# Patient Record
Sex: FEMALE | Race: WHITE | NOT HISPANIC OR LATINO | Employment: OTHER | URBAN - METROPOLITAN AREA
[De-identification: names, ages, dates, MRNs, and addresses within clinical notes are randomized per-mention and may not be internally consistent; named-entity substitution may affect disease eponyms.]

---

## 2017-02-06 ENCOUNTER — OFFICE VISIT (OUTPATIENT)
Dept: OPHTHALMOLOGY | Facility: CLINIC | Age: 82
End: 2017-02-06
Payer: MEDICARE

## 2017-02-06 DIAGNOSIS — H40.1132 PRIMARY OPEN ANGLE GLAUCOMA OF BOTH EYES, MODERATE STAGE: Primary | ICD-10-CM

## 2017-02-06 PROCEDURE — 99999 PR PBB SHADOW E&M-EST. PATIENT-LVL I: CPT | Mod: PBBFAC,,, | Performed by: OPTOMETRIST

## 2017-02-06 PROCEDURE — 92083 EXTENDED VISUAL FIELD XM: CPT | Mod: S$GLB,,, | Performed by: OPTOMETRIST

## 2017-02-06 PROCEDURE — 92014 COMPRE OPH EXAM EST PT 1/>: CPT | Mod: S$GLB,,, | Performed by: OPTOMETRIST

## 2017-02-06 NOTE — PROGRESS NOTES
HPI     3 months IOP check review 24-2 and GOCT. Itchy eyes sometimes. Patient   stop using Latanoprost x 3-4 weeks states drops was causing dizzines.   Medication eye drops if any: Latanoprost  Date last eye visit: 10/13/2016  Last full exam: 10/13/2016  Last IOP : 24/22  Last dilated eye exam and SDP's: 10/13/2016  Last HVF and HRT : today  High IOP: 30/26  CCT:549/559  -0/-1  Family Hx POAG: none       Last edited by Tasha Sheth on 2/6/2017 10:31 AM.         Assessment /Plan     For exam results, see Encounter Report.    Primary open angle glaucoma of both eyes, moderate stage  -     Almanza Visual Field - OU - Extended - Both Eyes      Changes in VF OD    Increased IOP without latanoprost  Restart latanoprost, if dizziness returns, will try another drop

## 2017-02-15 DIAGNOSIS — H40.9 GLAUCOMA, UNSPECIFIED GLAUCOMA, UNSPECIFIED LATERALITY: ICD-10-CM

## 2017-02-15 RX ORDER — LATANOPROST 50 UG/ML
1 SOLUTION/ DROPS OPHTHALMIC NIGHTLY
Qty: 2.5 ML | Refills: 11 | Status: SHIPPED | OUTPATIENT
Start: 2017-02-15 | End: 2018-06-18 | Stop reason: SDUPTHER

## 2017-05-10 ENCOUNTER — OFFICE VISIT (OUTPATIENT)
Dept: OPHTHALMOLOGY | Facility: CLINIC | Age: 82
End: 2017-05-10
Payer: MEDICARE

## 2017-05-10 DIAGNOSIS — H40.1132 PRIMARY OPEN ANGLE GLAUCOMA OF BOTH EYES, MODERATE STAGE: Primary | ICD-10-CM

## 2017-05-10 PROCEDURE — 92012 INTRM OPH EXAM EST PATIENT: CPT | Mod: S$GLB,,, | Performed by: OPTOMETRIST

## 2017-05-10 NOTE — PROGRESS NOTES
HPI     Pt doing well with Latanoprost, she states she uses it every night and   never misses      Medication eye drops if any: Latanoprost  Date last eye visit: 2/6/2017  Last full exam: 10/13/2016  Last IOP : 30/21  Last dilated eye exam and SDP's: 10/13/2016  Last HVF and HRT : 2/6/17  High IOP: 30/26  CCT:549/559  -0/-1  Family Hx POAG: none       Last edited by WHITNEY Ruiz on 5/10/2017 10:55 AM.         Assessment /Plan     For exam results, see Encounter Report.    Primary open angle glaucoma of both eyes, moderate stage      Decreased IOP OD    Continue Latanoprost hs OU    RTC 4 months IOP ck

## 2017-09-13 ENCOUNTER — OFFICE VISIT (OUTPATIENT)
Dept: OPHTHALMOLOGY | Facility: CLINIC | Age: 82
End: 2017-09-13
Payer: MEDICARE

## 2017-09-13 DIAGNOSIS — H40.1132 PRIMARY OPEN ANGLE GLAUCOMA OF BOTH EYES, MODERATE STAGE: Primary | ICD-10-CM

## 2017-09-13 DIAGNOSIS — H16.9 KERATITIS: ICD-10-CM

## 2017-09-13 PROCEDURE — 99999 PR PBB SHADOW E&M-EST. PATIENT-LVL I: CPT | Mod: PBBFAC,,, | Performed by: OPTOMETRIST

## 2017-09-13 PROCEDURE — 92012 INTRM OPH EXAM EST PATIENT: CPT | Mod: S$GLB,,, | Performed by: OPTOMETRIST

## 2017-09-13 NOTE — PROGRESS NOTES
HPI     Glaucoma    Additional comments: Latanoprost           Comments   4 months IOP check. No visual complaints. Eyes burns a lot right eye more   than left eye.  Medication eye drops if any: Latanoprost  Date last eye visit:05/10/2017  Last full exam: 10/13/2016  Last IOP : 22/22  Last dilated eye exam and SDP's: 10/13/2016  Last HVF and HRT : 02/06/2017  High IOP: 30/26  CCT: 549/559 -0/-1   Family Hx POAG: none       Last edited by Tasha Sheth on 9/13/2017  9:56 AM. (History)            Assessment /Plan     For exam results, see Encounter Report.    Primary open angle glaucoma of both eyes, moderate stage    Keratitis      Mild increased IOP OD, decreased OS    Continue latanoprost hs OU, close eyes for 2 minutes after drops.    AT prn OU

## 2017-12-18 ENCOUNTER — PATIENT OUTREACH (OUTPATIENT)
Dept: ADMINISTRATIVE | Facility: HOSPITAL | Age: 82
End: 2017-12-18

## 2017-12-28 ENCOUNTER — OFFICE VISIT (OUTPATIENT)
Dept: INTERNAL MEDICINE | Facility: CLINIC | Age: 82
End: 2017-12-28
Payer: MEDICARE

## 2017-12-28 VITALS
BODY MASS INDEX: 17.97 KG/M2 | HEART RATE: 84 BPM | TEMPERATURE: 97 F | OXYGEN SATURATION: 97 % | SYSTOLIC BLOOD PRESSURE: 120 MMHG | DIASTOLIC BLOOD PRESSURE: 68 MMHG | WEIGHT: 101.44 LBS | HEIGHT: 63 IN

## 2017-12-28 DIAGNOSIS — E03.9 HYPOTHYROIDISM, UNSPECIFIED TYPE: ICD-10-CM

## 2017-12-28 DIAGNOSIS — Z76.89 ESTABLISHING CARE WITH NEW DOCTOR, ENCOUNTER FOR: Primary | ICD-10-CM

## 2017-12-28 DIAGNOSIS — E11.69 TYPE 2 DIABETES MELLITUS WITH OTHER SPECIFIED COMPLICATION, WITHOUT LONG-TERM CURRENT USE OF INSULIN: ICD-10-CM

## 2017-12-28 DIAGNOSIS — R01.1 HEART MURMUR: ICD-10-CM

## 2017-12-28 DIAGNOSIS — R54 ADVANCED AGE: ICD-10-CM

## 2017-12-28 DIAGNOSIS — I10 HYPERTENSION, UNSPECIFIED TYPE: ICD-10-CM

## 2017-12-28 PROCEDURE — 99204 OFFICE O/P NEW MOD 45 MIN: CPT | Mod: S$GLB,,, | Performed by: FAMILY MEDICINE

## 2017-12-28 PROCEDURE — 99999 PR PBB SHADOW E&M-EST. PATIENT-LVL III: CPT | Mod: PBBFAC,,, | Performed by: FAMILY MEDICINE

## 2017-12-28 RX ORDER — LOSARTAN POTASSIUM 100 MG/1
TABLET ORAL
COMMUNITY
Start: 2017-12-05 | End: 2018-08-16 | Stop reason: SDUPTHER

## 2017-12-28 RX ORDER — LEVOTHYROXINE SODIUM 125 UG/1
125 TABLET ORAL DAILY
COMMUNITY
End: 2018-07-13 | Stop reason: SDUPTHER

## 2017-12-28 NOTE — PROGRESS NOTES
"Subjective:       Patient ID: Lauren Le is a 89 y.o. female.    Chief Complaint: Establish Care (pt presents today to establish care)    HPI     89 yo F    Presents to establish Care  Previous PCP no longer taking her insurance    PMH   HTN, " pre- diabetes " ( or on the "edge of it" ), Neuropathy, hypothyroidism, anxiety/ depression    Sees Opth   No other specialist.    Non smoker  No drinker  No drugs    No exercise.  Lives by herself - Son lives nearby and acts as caretaker.  Will walk house to house.    Walks with a walker.  Feels steady and safe.  Doing well with the 4 steps she has going up to her house.    Got Flu shot in October  Pneumococcal vaccine few years ago.                  Review of Systems   Constitutional: Negative for chills, fever and unexpected weight change.   HENT: Negative for trouble swallowing.    Eyes: Negative for visual disturbance.   Respiratory: Positive for shortness of breath.    Cardiovascular: Negative for chest pain.   Gastrointestinal: Negative for constipation and diarrhea.   Genitourinary: Negative for difficulty urinating.   Musculoskeletal: Negative for gait problem.   Neurological: Negative for dizziness and light-headedness.        Occasional dizziness.       Objective:       Vitals:    12/28/17 1107   BP: 120/68   Pulse: 84   Temp: 96.7 °F (35.9 °C)       Physical Exam   Constitutional: She is oriented to person, place, and time. She appears well-developed and well-nourished. She does not have a sickly appearance. No distress.   HENT:   Head: Normocephalic and atraumatic.   Right Ear: External ear normal.   Left Ear: External ear normal.   Eyes: Conjunctivae, EOM and lids are normal.   Neck: Trachea normal, normal range of motion and full passive range of motion without pain.   Cardiovascular: Normal rate, regular rhythm and intact distal pulses.    Murmur heard.  Pulmonary/Chest: Effort normal and breath sounds normal. No stridor. No respiratory distress. "   Abdominal: Normal appearance.   Musculoskeletal: Normal range of motion. She exhibits no edema.   Neurological: She is alert and oriented to person, place, and time. She is not disoriented.   Skin: Skin is warm, dry and intact. No rash noted. She is not diaphoretic.   Psychiatric: She has a normal mood and affect. Her speech is normal and behavior is normal. Thought content normal.       Assessment:       1. Establishing care with new doctor, encounter for    2. Hypertension, unspecified type    3. Type 2 diabetes mellitus with other specified complication, without long-term current use of insulin    4. Hypothyroidism, unspecified type    5. Heart murmur    6. Advanced age        Plan:   Establishing care with new doctor, encounter for    Hypertension, unspecified type  Well controlled  Continue medications. No changes.    Type 2 diabetes mellitus with other specified complication, without long-term current use of insulin  Plan on obtaining medical records for review.    Hypothyroidism, unspecified type  Plan on obtaining lab records  On synthroid    Heart murmur  Suspect Aortic stenosis.  Discussed at length with patient and son.  Given she feels well and has no current complaints they do not want to investigate murmur or meet with cardiologist.  Explained risk - they understood and agreed.  Given her age and current state of doing well I am comfortable with this.     Advanced age  Discussed end of life issues.  Nephew - who is present at today's visit - is power of .   Uncertain of code status - encouraged patient to think about and discuss whether or not to be full code or DNR.       Plan to obtain labs  Plan to obtain records    F/u in 4 months as of now.       No Follow-up on file.

## 2018-01-31 ENCOUNTER — OFFICE VISIT (OUTPATIENT)
Dept: OPHTHALMOLOGY | Facility: CLINIC | Age: 83
End: 2018-01-31
Payer: MEDICARE

## 2018-01-31 DIAGNOSIS — H16.9 KERATITIS: ICD-10-CM

## 2018-01-31 DIAGNOSIS — H35.3131 NONEXUDATIVE AGE-RELATED MACULAR DEGENERATION, BILATERAL, EARLY DRY STAGE: ICD-10-CM

## 2018-01-31 DIAGNOSIS — H52.7 REFRACTION ERROR: ICD-10-CM

## 2018-01-31 DIAGNOSIS — H40.1132 PRIMARY OPEN ANGLE GLAUCOMA OF BOTH EYES, MODERATE STAGE: Primary | ICD-10-CM

## 2018-01-31 DIAGNOSIS — E11.9 DIABETES MELLITUS TYPE 2 WITHOUT RETINOPATHY: ICD-10-CM

## 2018-01-31 PROCEDURE — 92015 DETERMINE REFRACTIVE STATE: CPT | Mod: S$GLB,,, | Performed by: OPTOMETRIST

## 2018-01-31 PROCEDURE — 92014 COMPRE OPH EXAM EST PT 1/>: CPT | Mod: S$GLB,,, | Performed by: OPTOMETRIST

## 2018-01-31 PROCEDURE — 99999 PR PBB SHADOW E&M-EST. PATIENT-LVL I: CPT | Mod: PBBFAC,,, | Performed by: OPTOMETRIST

## 2018-01-31 PROCEDURE — 99499 UNLISTED E&M SERVICE: CPT | Mod: S$GLB,,, | Performed by: OPTOMETRIST

## 2018-01-31 NOTE — PROGRESS NOTES
HPI     NIDDM exam. No visual complaints. Patient wears reading glasses.   Medication eye drops if any: Latanoprost  Date last eye visit:09/13/2017  Last full exam: 10/13/2016  Last IOP : 24/20  Last dilated eye exam and SDP's: 10/13/2016  Last HVF and HRT : 02/06/2017  High IOP: 30/26  CCT: 549/559 -0/-1   Family Hx POAG: none    Last edited by Tasha Sheth on 1/31/2018 10:25 AM. (History)            Assessment /Plan     For exam results, see Encounter Report.    Primary open angle glaucoma of both eyes, moderate stage    Diabetes mellitus type 2 without retinopathy    Keratitis    Refraction error    Nonexudative age-related macular degeneration, bilateral, early dry stage      No Background Diabetic Retinopathy    Stable IOP     Dry AMD OD    AT prn OU    Dispense Final Rx for glasses.  RTC 4 months vf goct iop ck

## 2018-02-02 DIAGNOSIS — E11.9 TYPE 2 DIABETES MELLITUS WITHOUT COMPLICATION: ICD-10-CM

## 2018-02-22 RX ORDER — METOPROLOL SUCCINATE 50 MG/1
50 TABLET, EXTENDED RELEASE ORAL DAILY
Qty: 90 TABLET | Refills: 1 | Status: SHIPPED | OUTPATIENT
Start: 2018-02-22 | End: 2019-01-24 | Stop reason: SDUPTHER

## 2018-03-05 NOTE — TELEPHONE ENCOUNTER
----- Message from Flavio Clements sent at 3/5/2018 12:06 PM CST -----  Contact: pt/caregiver alcira Adams is returning a nurse call.       [phone] (vdmv)

## 2018-03-05 NOTE — TELEPHONE ENCOUNTER
Patient contacted clinic to inform nurse that patient has a appointment scheduled for 04/26/2018 and to get a refill on patient's Tradjenta. Patient informed that refill has been completed and sent to pharmacy. Patient verbalized a complete understanding.

## 2018-03-22 RX ORDER — GABAPENTIN 100 MG/1
CAPSULE ORAL
Qty: 270 CAPSULE | Refills: 2 | Status: SHIPPED | OUTPATIENT
Start: 2018-03-22 | End: 2018-06-07 | Stop reason: DRUGHIGH

## 2018-03-22 RX ORDER — SERTRALINE HYDROCHLORIDE 25 MG/1
25 TABLET, FILM COATED ORAL DAILY
Qty: 90 TABLET | Refills: 3 | Status: SHIPPED | OUTPATIENT
Start: 2018-03-22 | End: 2019-03-05 | Stop reason: SDUPTHER

## 2018-04-02 PROBLEM — R94.4 DECREASED GFR: Status: ACTIVE | Noted: 2018-04-02

## 2018-04-02 PROBLEM — E03.9 HYPOTHYROIDISM: Status: ACTIVE | Noted: 2018-04-02

## 2018-04-02 PROBLEM — E11.9 TYPE 2 DIABETES MELLITUS: Status: ACTIVE | Noted: 2018-04-02

## 2018-04-02 PROBLEM — I10 HYPERTENSION: Status: ACTIVE | Noted: 2018-04-02

## 2018-04-02 PROBLEM — Z86.2 HISTORY OF THROMBOCYTOSIS: Status: ACTIVE | Noted: 2018-04-02

## 2018-04-02 PROBLEM — I70.0 CALCIFICATION OF AORTA: Status: ACTIVE | Noted: 2018-04-02

## 2018-04-02 PROBLEM — Z86.2 HISTORY OF ANEMIA: Status: ACTIVE | Noted: 2018-04-02

## 2018-04-04 ENCOUNTER — OFFICE VISIT (OUTPATIENT)
Dept: INTERNAL MEDICINE | Facility: CLINIC | Age: 83
End: 2018-04-04
Payer: MEDICARE

## 2018-04-04 VITALS
HEART RATE: 64 BPM | BODY MASS INDEX: 16.91 KG/M2 | DIASTOLIC BLOOD PRESSURE: 58 MMHG | HEIGHT: 63 IN | OXYGEN SATURATION: 97 % | SYSTOLIC BLOOD PRESSURE: 152 MMHG | TEMPERATURE: 97 F | WEIGHT: 95.44 LBS

## 2018-04-04 DIAGNOSIS — E11.49 TYPE II DIABETES MELLITUS WITH NEUROLOGICAL MANIFESTATIONS: ICD-10-CM

## 2018-04-04 DIAGNOSIS — H91.90 HEARING DIFFICULTY, UNSPECIFIED LATERALITY: ICD-10-CM

## 2018-04-04 DIAGNOSIS — Z91.81 AT RISK FOR FALLS: ICD-10-CM

## 2018-04-04 DIAGNOSIS — F32.9 MAJOR DEPRESSION, CHRONIC: ICD-10-CM

## 2018-04-04 DIAGNOSIS — H40.1132 PRIMARY OPEN ANGLE GLAUCOMA OF BOTH EYES, MODERATE STAGE: ICD-10-CM

## 2018-04-04 DIAGNOSIS — K92.1 BLACK STOOLS: ICD-10-CM

## 2018-04-04 DIAGNOSIS — H35.3131 NONEXUDATIVE AGE-RELATED MACULAR DEGENERATION, BILATERAL, EARLY DRY STAGE: ICD-10-CM

## 2018-04-04 DIAGNOSIS — R41.3 MEMORY DIFFICULTIES: ICD-10-CM

## 2018-04-04 DIAGNOSIS — Z86.2 HISTORY OF THROMBOCYTOSIS: ICD-10-CM

## 2018-04-04 DIAGNOSIS — I70.0 CALCIFICATION OF AORTA: ICD-10-CM

## 2018-04-04 DIAGNOSIS — M79.605 BILATERAL LEG PAIN: ICD-10-CM

## 2018-04-04 DIAGNOSIS — I10 HYPERTENSION, UNSPECIFIED TYPE: ICD-10-CM

## 2018-04-04 DIAGNOSIS — E03.9 HYPOTHYROIDISM, UNSPECIFIED TYPE: ICD-10-CM

## 2018-04-04 DIAGNOSIS — Z86.2 HISTORY OF ANEMIA: ICD-10-CM

## 2018-04-04 DIAGNOSIS — R09.89 DECREASED DORSALIS PEDIS PULSE: ICD-10-CM

## 2018-04-04 DIAGNOSIS — R94.4 DECREASED GFR: ICD-10-CM

## 2018-04-04 DIAGNOSIS — M79.604 BILATERAL LEG PAIN: ICD-10-CM

## 2018-04-04 DIAGNOSIS — R32 URINARY INCONTINENCE, UNSPECIFIED TYPE: ICD-10-CM

## 2018-04-04 DIAGNOSIS — I49.9 IRREGULAR HEART RHYTHM: ICD-10-CM

## 2018-04-04 DIAGNOSIS — Z00.00 ENCOUNTER FOR PREVENTIVE HEALTH EXAMINATION: Primary | ICD-10-CM

## 2018-04-04 PROCEDURE — 99499 UNLISTED E&M SERVICE: CPT | Mod: S$PBB,,, | Performed by: NURSE PRACTITIONER

## 2018-04-04 PROCEDURE — 99999 PR PBB SHADOW E&M-EST. PATIENT-LVL IV: CPT | Mod: PBBFAC,,, | Performed by: NURSE PRACTITIONER

## 2018-04-04 PROCEDURE — G0439 PPPS, SUBSEQ VISIT: HCPCS | Mod: S$GLB,,, | Performed by: NURSE PRACTITIONER

## 2018-04-04 NOTE — Clinical Note
Your patient was seen today for a HRA visit. Multiple abnormalities have been identified during this visit that may require additional testing and  follow up.  She does not have an appointment scheduled with you until 4/26, was not sure if you would want to see her sooner regarding HRA findings.  I have included a copy of my visit note, please review the note and feel free to contact me with any questions.  Thank you for allowing me to participate in the care of your patients.  Aurelia Morales NP

## 2018-04-04 NOTE — PROGRESS NOTES
"Lauren Le presented for a  Medicare AWV and comprehensive Health Risk Assessment today. The following components were reviewed and updated:    · Medical history  · Family History  · Social history  · Allergies and Current Medications  · Health Risk Assessment  · Health Maintenance  · Care Team     ** See Completed Assessments for Annual Wellness Visit within the encounter summary.**       The following assessments were completed:  · Living Situation  · CAGE  · Depression Screening  · Timed Get Up and Go  · Whisper Test  · Cognitive Function Screening  · Nutrition Screening  · ADL Screening  · PAQ Screening    Vitals:    04/04/18 1039   BP: (!) 152/58   BP Location: Left arm   Patient Position: Sitting   BP Method: Small (Manual)   Pulse: 64   Temp: 96.9 °F (36.1 °C)   TempSrc: Tympanic   SpO2: 97%   Weight: 43.3 kg (95 lb 7.4 oz)   Height: 5' 3" (1.6 m)     Body mass index is 16.91 kg/m².  Physical Exam   Constitutional: She is oriented to person, place, and time. She appears well-developed.   Thin appearing.   Patient accompanied by nephew, who was present throughout the visit and aided in information gathering.      HENT:   Head: Normocephalic.   Cardiovascular: Normal rate.  An irregular rhythm present.   Murmur heard.  Pulses:       Dorsalis pedis pulses are 1+ on the right side, and 1+ on the left side.   Pulmonary/Chest: Effort normal and breath sounds normal. No respiratory distress.   Abdominal: Soft. She exhibits no mass. There is no tenderness.   Musculoskeletal: Normal range of motion. She exhibits no edema.   Feet:   Right Foot:   Protective Sensation: 10 sites tested. 10 sites sensed.   Skin Integrity: Negative for ulcer or blister.   Left Foot:   Protective Sensation: 10 sites tested. 10 sites sensed.   Skin Integrity: Negative for ulcer or blister.   Neurological: She is alert and oriented to person, place, and time. She exhibits normal muscle tone.   Sensory exam of the feet is normal, tested with " the monofilament.   Skin: Skin is warm, dry and intact.   Bilateral feet cool to touch with normal color.   Yellowing noted to toenails. Advised to follow up with PCP for treatment. She expressed understanding.     Psychiatric: She has a normal mood and affect. Her speech is normal and behavior is normal.   Nursing note and vitals reviewed.        Diagnoses and health risks identified today and associated recommendations/orders:    1. Encounter for preventive health examination  Discussed health maintenance. They would like to wait and discuss/schedule with PCP at upcoming appointment.     2. Type II diabetes mellitus with neurological manifestations  See outside lab under Media 4/28/2014 A1C 7.0, no recent check.   She does report numbness, tingling, and burning sensation in bilateral feet (at night), ongoing for years, per patient stable on Neurontin.   Encouraged daily foot inspections.   Encouraged yearly eye exams.    Advised to follow up with PCP for further evaluation and recommendations. They expressed understanding.      3. Hypertension, unspecified type  BP elevated at today's visit. Nephew reports blood pressure is always high. Discussed s/s of MI and stroke (patient denies any s/s at this time) and advised to go to the ER if occur. Advised patient to monitor BP, keep a log, and follow up with PCP for further evaluation and treatment. They expressed understanding.     4. Calcification of aorta  CXR 9/7/2011---Normal size heart. Calcium deposition aortic arch.  Discussed diagnosis and risk reduction.   Advised to follow up with PCP for further recommendations. They expressed understanding.      5. Bilateral leg pain  Reports bilateral leg pain with legs elevated that improves with legs in dependant position, ongoing for about a year, denies any worsening.   Decreased DP pulses noted on PE.   Discussed [facility]K for further evaluation. They declined scheduling today, would like to wait and discuss with PCP at  upcoming visit.   Advised to follow up with PCP for further evaluation. They expressed understanding.      6. Decreased dorsalis pedis pulse  See #5    7. Irregular heart rhythm  Noted on PE.   Advised to follow up with PCP for further evaluation and recommendations. They expressed understanding.      8. Hypothyroidism, unspecified type  No recent check.   Advised to follow up with PCP for further evaluation and recommendations. They expressed understanding.      9. Major depression, chronic  Reports has been dealing with depression for a couple of years and has been on Zoloft for that time. She feels depression is controlled on medication. Discussed the importance of not abruptly stopping medication to first consult with PCP. She and nephew expressed understanding.  PHQ 2-0  Stable and controlled. Continue current treatment plan as previously prescribed with your PCP.     10. Memory difficulties  Reports memory difficulties.   Abnormal cognitive function screening (4).   Advised to follow up with PCP for further evaluation and recommendations. They expressed understanding.      11. Urinary incontinence, unspecified type  Reports daily UI, not new and not worsening. Wears a pad.   Has seen gynecologist, Dr. AUDREY Adhikari, in the past.   Recommendations given.   Continue current treatment plan as previously prescribed with your gynecologist.     12. Black stools  Reports hard black stools. Denies tarry consistency. Reports has been ongoing for 3-6 months. Denies hematochezia, epigastric pain, heartburn, or acid reflux.   Reports daily bowel movements. Last bowel movement was this morning.   Advised to follow up with PCP for further evaluation and treatment. They expressed understanding.      13. History of anemia  See outside lab under Media 4/28/2014  See #12. No recent check.   Denies hemoptysis, hematemesis, epistaxis, or hematuria.   Advised to follow up with PCP for further evaluation and recommendations. They  expressed understanding.      14. At risk for falls  Reports 2+ falls in the last 12 months. Last fall was about a month ago. Uses a walker to aid in ambulation.   Abnormal timed get up and go test.   Fall precautions reviewed with patient. They expressed understanding.   Advised to follow up with PCP for further recommendations. They expressed understanding.      15. Hearing difficulty, unspecified laterality  Reports difficulty hearing.   Abnormal whisper test.   Advised to follow up with PCP for further recommendations. They expressed understanding.      16. Primary open angle glaucoma of both eyes, moderate stage  Continue current treatment plan as previously prescribed with your eye doctor, Dr. Nugent.     17. Nonexudative age-related macular degeneration, bilateral, early dry stage  Continue current treatment plan as previously prescribed with your eye doctor, Dr. Nugent.     18. Decreased GFR  No recent check.   Advised to avoid NSAIDs.   Advised to follow up with PCP for further evaluation and recommendations. They expressed understanding.      19. History of thrombocytosis  See outside lab under Media 4/28/2014  No recent check.   Advised to follow up with PCP for further evaluation and recommendations. They expressed understanding.      Provided Vera with a 5-10 year written screening schedule and personal prevention plan. Education, counseling, and referrals were provided as needed. After Visit Summary printed and given to patient which includes a list of additional screenings\tests needed.    Follow-up in about 1 year (around 4/4/2019) for AWV.    Aurelia Morales NP

## 2018-04-04 NOTE — PATIENT INSTRUCTIONS
Counseling and Referral of Other Preventative  (Italic type indicates deductible and co-insurance are waived)    Patient Name: Lauren Le  Today's Date: 4/4/2018    Health Maintenance       Date Due Completion Date    Lipid Panel 01/24/1918 ---    Hemoglobin A1c 01/24/1918 ---    Foot Exam 01/24/1928 ---    TETANUS VACCINE 01/24/1936 ---    Zoster Vaccine 01/24/1978 ---    Pneumococcal (65+) (2 of 2 - PPSV23) 11/06/2018 11/6/2017    Eye Exam 01/31/2019 1/31/2018        No orders of the defined types were placed in this encounter.    The following information is provided to all patients.  This information is to help you find resources for any of the problems found today that may be affecting your health:                Living healthy guide: www.[facility].[place].[facility]      Understanding Diabetes: www.diabetes.org      Eating healthy: www.cdc.gov/healthyweight      CDC home safety checklist: www.cdc.gov/steadi/patient.html      Agency on Aging: www.goea.[place].gov      Alcoholics anonymous (AA): www.aa.org      Physical Activity: www.nevaeh.nih.gov/zs6ncpk      Tobacco use: www.quitwithusla.org

## 2018-04-05 ENCOUNTER — TELEPHONE (OUTPATIENT)
Dept: INTERNAL MEDICINE | Facility: CLINIC | Age: 83
End: 2018-04-05

## 2018-04-05 NOTE — TELEPHONE ENCOUNTER
----- Message from Olman Cleveland sent at 4/5/2018  1:13 PM CDT -----  Contact: Care Giver- Buhqb-[phone]   Returning call, please call back at [phone].  Thx-AH

## 2018-04-05 NOTE — TELEPHONE ENCOUNTER
----- Message from Kevin Escamilla MD sent at 4/5/2018  6:59 AM CDT -----  Thanks for the message Aurelia, and yes I would prefer see her sooner with those black stools noted -    SMOOTH Escamilla Staff inbox:  Please call and arrange for sooner appointment.  Thanks!      ----- Message -----  From: Aurelia Morales NP  Sent: 4/4/2018   1:21 PM  To: Kevin Escamilla MD    Your patient was seen today for a HRA visit. Multiple abnormalities have been identified during this visit that may require additional testing and  follow up.   She does not have an appointment scheduled with you until 4/26, was not sure if you would want to see her sooner regarding HRA findings.   I have included a copy of my visit note, please review the note and feel free to contact me with any questions.   Thank you for allowing me to participate in the care of your patients.   Aurelia Morales NP

## 2018-04-09 ENCOUNTER — LAB VISIT (OUTPATIENT)
Dept: LAB | Facility: HOSPITAL | Age: 83
End: 2018-04-09
Attending: FAMILY MEDICINE
Payer: MEDICARE

## 2018-04-09 ENCOUNTER — OFFICE VISIT (OUTPATIENT)
Dept: INTERNAL MEDICINE | Facility: CLINIC | Age: 83
End: 2018-04-09
Payer: MEDICARE

## 2018-04-09 VITALS
RESPIRATION RATE: 16 BRPM | BODY MASS INDEX: 16.8 KG/M2 | TEMPERATURE: 97 F | HEIGHT: 63 IN | OXYGEN SATURATION: 98 % | DIASTOLIC BLOOD PRESSURE: 80 MMHG | WEIGHT: 94.81 LBS | HEART RATE: 66 BPM | SYSTOLIC BLOOD PRESSURE: 140 MMHG

## 2018-04-09 DIAGNOSIS — E11.49 TYPE II DIABETES MELLITUS WITH NEUROLOGICAL MANIFESTATIONS: ICD-10-CM

## 2018-04-09 DIAGNOSIS — N81.10 FEMALE CYSTOCELE: ICD-10-CM

## 2018-04-09 DIAGNOSIS — I49.9 IRREGULAR HEART RHYTHM: ICD-10-CM

## 2018-04-09 DIAGNOSIS — I49.9 IRREGULAR HEART RHYTHM: Primary | ICD-10-CM

## 2018-04-09 DIAGNOSIS — R32 URINARY INCONTINENCE, UNSPECIFIED TYPE: ICD-10-CM

## 2018-04-09 DIAGNOSIS — K92.1 BLACK STOOLS: ICD-10-CM

## 2018-04-09 DIAGNOSIS — M54.9 BACK PAIN, UNSPECIFIED BACK LOCATION, UNSPECIFIED BACK PAIN LATERALITY, UNSPECIFIED CHRONICITY: ICD-10-CM

## 2018-04-09 DIAGNOSIS — E11.9 TYPE 2 DIABETES MELLITUS WITHOUT COMPLICATION: ICD-10-CM

## 2018-04-09 DIAGNOSIS — Z86.2 HISTORY OF ANEMIA: ICD-10-CM

## 2018-04-09 LAB
ALBUMIN SERPL BCP-MCNC: 4.5 G/DL
ALP SERPL-CCNC: 97 U/L
ALT SERPL W/O P-5'-P-CCNC: 25 U/L
ANION GAP SERPL CALC-SCNC: 9 MMOL/L
AST SERPL-CCNC: 29 U/L
BASOPHILS # BLD AUTO: 0.07 K/UL
BASOPHILS NFR BLD: 0.7 %
BILIRUB SERPL-MCNC: 0.7 MG/DL
BUN SERPL-MCNC: 28 MG/DL
CALCIUM SERPL-MCNC: 10.8 MG/DL
CHLORIDE SERPL-SCNC: 104 MMOL/L
CHOLEST SERPL-MCNC: 196 MG/DL
CHOLEST/HDLC SERPL: 2.5 {RATIO}
CO2 SERPL-SCNC: 27 MMOL/L
CREAT SERPL-MCNC: 1.1 MG/DL
DIFFERENTIAL METHOD: NORMAL
EOSINOPHIL # BLD AUTO: 0.4 K/UL
EOSINOPHIL NFR BLD: 3.9 %
ERYTHROCYTE [DISTWIDTH] IN BLOOD BY AUTOMATED COUNT: 13.6 %
EST. GFR  (AFRICAN AMERICAN): 48 ML/MIN/1.73 M^2
EST. GFR  (NON AFRICAN AMERICAN): 41 ML/MIN/1.73 M^2
GLUCOSE SERPL-MCNC: 141 MG/DL
HCT VFR BLD AUTO: 38.2 %
HDLC SERPL-MCNC: 77 MG/DL
HDLC SERPL: 39.3 %
HGB BLD-MCNC: 13.1 G/DL
LDLC SERPL CALC-MCNC: 86.4 MG/DL
LYMPHOCYTES # BLD AUTO: 3.4 K/UL
LYMPHOCYTES NFR BLD: 32.9 %
MCH RBC QN AUTO: 30.9 PG
MCHC RBC AUTO-ENTMCNC: 34.3 G/DL
MCV RBC AUTO: 90 FL
MONOCYTES # BLD AUTO: 0.9 K/UL
MONOCYTES NFR BLD: 8.5 %
NEUTROPHILS # BLD AUTO: 5.5 K/UL
NEUTROPHILS NFR BLD: 54 %
NONHDLC SERPL-MCNC: 119 MG/DL
PLATELET # BLD AUTO: 214 K/UL
PMV BLD AUTO: 10.4 FL
POTASSIUM SERPL-SCNC: 4.5 MMOL/L
PROT SERPL-MCNC: 8.4 G/DL
RBC # BLD AUTO: 4.24 M/UL
SODIUM SERPL-SCNC: 140 MMOL/L
T4 FREE SERPL-MCNC: 1.22 NG/DL
TRIGL SERPL-MCNC: 163 MG/DL
TSH SERPL DL<=0.005 MIU/L-ACNC: 6.02 UIU/ML
WBC # BLD AUTO: 10.2 K/UL

## 2018-04-09 PROCEDURE — 80061 LIPID PANEL: CPT

## 2018-04-09 PROCEDURE — 93005 ELECTROCARDIOGRAM TRACING: CPT | Mod: S$GLB,,, | Performed by: FAMILY MEDICINE

## 2018-04-09 PROCEDURE — 80053 COMPREHEN METABOLIC PANEL: CPT

## 2018-04-09 PROCEDURE — 99499 UNLISTED E&M SERVICE: CPT | Mod: S$PBB,,, | Performed by: FAMILY MEDICINE

## 2018-04-09 PROCEDURE — 93010 ELECTROCARDIOGRAM REPORT: CPT | Mod: S$GLB,,, | Performed by: NUCLEAR MEDICINE

## 2018-04-09 PROCEDURE — 83036 HEMOGLOBIN GLYCOSYLATED A1C: CPT

## 2018-04-09 PROCEDURE — 99214 OFFICE O/P EST MOD 30 MIN: CPT | Mod: S$GLB,,, | Performed by: FAMILY MEDICINE

## 2018-04-09 PROCEDURE — 84439 ASSAY OF FREE THYROXINE: CPT

## 2018-04-09 PROCEDURE — 99999 PR PBB SHADOW E&M-EST. PATIENT-LVL IV: CPT | Mod: PBBFAC,,, | Performed by: FAMILY MEDICINE

## 2018-04-09 PROCEDURE — 85025 COMPLETE CBC W/AUTO DIFF WBC: CPT

## 2018-04-09 PROCEDURE — 84443 ASSAY THYROID STIM HORMONE: CPT

## 2018-04-09 NOTE — PROGRESS NOTES
"Subjective:       Patient ID: Lauren Le is a 89 y.o. female.    Chief Complaint: Follow-up    HPI     89 yo F    Her only physical complaint is a hip pain -   Ongoing issue for several weeks to months.    Recent HRA - identified a number of problems.    DM II -A1c last check in 2014 - was 7.0  Does reports some neuropathy     HTN:  Does have elevated pressures.    Some concern on Dorsalis pedis pulse - she denies any leg pain.    H/O hypothyroidism -     Recent irregular rhythm found on physical examination    Reports black stools - for about a month - has not gone away.  Feelings of shortness of breath.  Not taking iron tablets.      Review of Systems   Constitutional: Negative for chills and fever.   Respiratory: Positive for shortness of breath.    Cardiovascular: Negative for chest pain.   Gastrointestinal: Negative for abdominal pain and blood in stool.        Reports dark stools - "black" as my mouse.   Genitourinary: Negative for difficulty urinating.       Objective:       Vitals:    04/09/18 1044   BP: (!) 140/80   Pulse: 66   Resp: 16   Temp: 97.2 °F (36.2 °C)       Physical Exam   Constitutional: She is oriented to person, place, and time. She appears well-developed and well-nourished. She does not have a sickly appearance. No distress.   HENT:   Head: Normocephalic and atraumatic.   Right Ear: External ear normal.   Left Ear: External ear normal.   Eyes: Conjunctivae, EOM and lids are normal.   Neck: Trachea normal, normal range of motion and full passive range of motion without pain.   Cardiovascular: Normal rate and regular rhythm.    Murmur heard.  Pulmonary/Chest: Effort normal and breath sounds normal. No stridor. No respiratory distress.   Abdominal: Soft. Normal appearance and bowel sounds are normal. She exhibits no distension. There is no tenderness. There is no guarding. No hernia.   Genitourinary: Rectal exam shows guaiac negative stool.   Genitourinary Comments: Large protruding mass " from vagina    Musculoskeletal: Normal range of motion.   Lymphadenopathy:     She has no cervical adenopathy.   Neurological: She is alert and oriented to person, place, and time. She is not disoriented.   Skin: Skin is warm, dry and intact. No rash noted. She is not diaphoretic.   Psychiatric: She has a normal mood and affect. Her speech is normal and behavior is normal. Thought content normal.       Assessment:       1. Irregular heart rhythm    2. Black stools    3. Type II diabetes mellitus with neurological manifestations    4. History of anemia    5. Back pain, unspecified back location, unspecified back pain laterality, unspecified chronicity    6. Urinary incontinence, unspecified type    7. Female cystocele        Plan:   Irregular heart rhythm  -     IN OFFICE EKG 12-LEAD (to Muse)    Black stools  Uncertain if bleed - denies abdominal pain  STAT cbc  Will sent to GI. This is an ongoing issue.  Heme occult negative    Type II diabetes mellitus with neurological manifestations  A1c    History of anemia  cbc    Back pain, unspecified back location, unspecified back pain laterality, unspecified chronicity  Reports improving  Will check renal function    Urinary incontinence,   Large cystocele -Female cystocele  Prolapsed  Ongoing issues.        Plan on follow up as schedule or pending lab work.     Black stools  negative heme occult  Stat CBC          No Follow-up on file.

## 2018-04-10 LAB
ESTIMATED AVG GLUCOSE: 134 MG/DL
HBA1C MFR BLD HPLC: 6.3 %

## 2018-04-30 RX ORDER — AMLODIPINE BESYLATE 10 MG/1
10 TABLET ORAL DAILY
Qty: 30 TABLET | Refills: 1 | Status: SHIPPED | OUTPATIENT
Start: 2018-04-30 | End: 2018-12-04 | Stop reason: SDUPTHER

## 2018-05-03 ENCOUNTER — HOSPITAL ENCOUNTER (EMERGENCY)
Facility: HOSPITAL | Age: 83
Discharge: HOME | End: 2018-05-03
Attending: EMERGENCY MEDICINE
Payer: MEDICARE

## 2018-05-03 VITALS
TEMPERATURE: 99 F | BODY MASS INDEX: 16.78 KG/M2 | SYSTOLIC BLOOD PRESSURE: 160 MMHG | WEIGHT: 94 LBS | HEART RATE: 75 BPM | DIASTOLIC BLOOD PRESSURE: 74 MMHG | RESPIRATION RATE: 18 BRPM | OXYGEN SATURATION: 96 %

## 2018-05-03 DIAGNOSIS — M25.552 LEFT HIP PAIN: ICD-10-CM

## 2018-05-03 DIAGNOSIS — B02.9 HERPES ZOSTER WITHOUT COMPLICATION: Primary | ICD-10-CM

## 2018-05-03 PROCEDURE — 99284 EMERGENCY DEPT VISIT MOD MDM: CPT | Mod: 25

## 2018-05-03 RX ORDER — HYDROCODONE BITARTRATE AND ACETAMINOPHEN 5; 325 MG/1; MG/1
1 TABLET ORAL EVERY 4 HOURS PRN
Qty: 11 TABLET | Refills: 0 | Status: SHIPPED | OUTPATIENT
Start: 2018-05-03 | End: 2018-05-08 | Stop reason: ALTCHOICE

## 2018-05-03 RX ORDER — PREDNISONE 50 MG/1
50 TABLET ORAL DAILY
Qty: 5 TABLET | Refills: 0 | Status: SHIPPED | OUTPATIENT
Start: 2018-05-03 | End: 2018-05-08

## 2018-05-03 RX ORDER — VALACYCLOVIR HYDROCHLORIDE 1 G/1
1000 TABLET, FILM COATED ORAL 3 TIMES DAILY
Qty: 21 TABLET | Refills: 0 | Status: SHIPPED | OUTPATIENT
Start: 2018-05-03 | End: 2018-05-10

## 2018-05-03 NOTE — ED PROVIDER NOTES
SCRIBE #1 NOTE: I, Alva Jasso, am scribing for, and in the presence of, Issac Landry MD. I have scribed the entire note.      History      Chief Complaint   Patient presents with    Hip Pain     left hip for months       Review of patient's allergies indicates:  No Known Allergies     HPI   HPI    5/3/2018, 10:56 AM   History obtained from the patient      History of Present Illness: Lauren Le is a 89 y.o. female patient who presents to the Emergency Department for L hip pain which onset suddenly after a fall a couple weeks ago. Pt reports that she broke L hip about 5 years ago. Symptoms are constant and moderate in severity. No mitigating or exacerbating factors reported. Associated sxs include rash to L buttock. Patient denies any head injury/trauma, LOC,  HA, dizziness, back pain, neck pain, knee pain, abd pain, CP, SOB, numbness, and all other sxs at this time. No further complaints or concerns at this time.         Arrival mode: Personal vehicle      PCP: Kevin Escamilla MD       Past Medical History:  Past Medical History:   Diagnosis Date    Anemia     Anxiety     Depression     Diabetes mellitus  01/2014    borderline    DM (diabetes mellitus) 01/2014     Borderline BS doesn't check 01/31/2018    DM2 (diabetes mellitus, type 2)     Glaucoma     HTN (hypertension)     Hypothyroidism        Past Surgical History:  Past Surgical History:   Procedure Laterality Date    bladder lift      TOTAL ABDOMINAL HYSTERECTOMY      for benign indications    TOTAL HIP ARTHROPLASTY      left hip         Family History:  Family History   Problem Relation Age of Onset    Cancer Sister      breast with mets    Diabetes Neg Hx     Heart disease Neg Hx     Kidney disease Neg Hx     Stroke Neg Hx        Social History:  Social History     Social History Main Topics    Smoking status: Never Smoker    Smokeless tobacco: Never Used    Alcohol use No    Drug use: No    Sexual activity: No        ROS   Review of Systems   Constitutional: Negative for fever.   HENT: Negative for sore throat.    Respiratory: Negative for shortness of breath.    Cardiovascular: Negative for chest pain.   Gastrointestinal: Negative for abdominal pain and nausea.   Genitourinary: Negative for dysuria.   Musculoskeletal: Negative for back pain and neck pain.        (+) L hip pain (-) knee pain   Skin: Positive for rash.   Neurological: Negative for dizziness, weakness, numbness and headaches.        (-) head injury/trauma, LOC   Hematological: Does not bruise/bleed easily.   All other systems reviewed and are negative.      Physical Exam      Initial Vitals [05/03/18 0946]   BP Pulse Resp Temp SpO2   (!) 175/68 79 20 98 °F (36.7 °C) 95 %      MAP       103.67          Physical Exam  Nursing Notes and Vital Signs Reviewed.  Constitutional: Patient is in no acute distress. Elderly.  Head: Atraumatic. Normocephalic.  Eyes: PERRL. EOM intact. Conjunctivae are not pale. No scleral icterus.  ENT: Mucous membranes are moist. Oropharynx is clear and symmetric.    Neck: Supple. Full ROM. No lymphadenopathy.  Cardiovascular: Regular rate. Regular rhythm. No murmurs, rubs, or gallops. Distal pulses are 2+ and symmetric.  Pulmonary/Chest: No respiratory distress. Clear to auscultation bilaterally. No wheezing or rales.  Abdominal: Soft and non-distended.  There is no tenderness.  No rebound, guarding, or rigidity.   Musculoskeletal: Moves all extremities. No obvious deformities. No edema.   Skin: Vesicles on an erythematous base to L buttock that does not cross midline.  This is the location of the pain.  Neurological:  Alert, awake, and appropriate.  Normal speech.  No acute focal neurological deficits are appreciated.  Psychiatric: Normal affect. Good eye contact. Appropriate in content.    ED Course    Procedures  ED Vital Signs:  Vitals:    05/03/18 0946   BP: (!) 175/68   Pulse: 79   Resp: 20   Temp: 98 °F (36.7 °C)   TempSrc: Oral    SpO2: 95%   Weight: 42.6 kg (94 lb)         Imaging Results:  Imaging Results          X-Ray Hip 2 or 3 views Left (Final result)  Result time 05/03/18 11:56:17    Final result by Chidi Christopher MD (05/03/18 11:56:17)                 Impression:      No acute fracture or dislocation.      Electronically signed by: Chidi Christopher MD  Date:    05/03/2018  Time:    11:56             Narrative:    EXAMINATION:  XR HIP 2 VIEW LEFT    CLINICAL HISTORY:  XR HIP 2 VIEW LEFT    FINDINGS:  Three views of the left hip were obtained.    No evidence of acute fracture or dislocation.  Diffuse osteopenia.  Rig left hip replacement without evidence of dislocation.  Moderate vascular calcifications are seen throughout.  Mild constipation.  Degenerative changes of the right hip are noted.                                        The Emergency Provider reviewed the vital signs and test results, which are outlined above.    ED Discussion     12:11 PM: Reassessed pt at this time.  Pt is awake, alert, and in NAD at this time. Discussed with pt all pertinent ED information and results. Discussed pt dx and plan of tx. Gave pt all f/u and return to the ED instructions. All questions and concerns were addressed at this time. Pt expresses understanding of information and instructions, and is comfortable with plan to discharge. Pt is stable for discharge.      I discussed with patient and/or family/caretaker that evaluation in the ED does not suggest any emergent or life threatening medical conditions requiring immediate intervention beyond what was provided in the ED, and I believe patient is safe for discharge.  Regardless, an unremarkable evaluation in the ED does not preclude the development or presence of a serious of life threatening condition. As such, patient was instructed to return immediately for any worsening or change in current symptoms.    ED Medication(s):  Medications - No data to display    New Prescriptions     HYDROCODONE-ACETAMINOPHEN 5-325MG (NORCO) 5-325 MG PER TABLET    Take 1 tablet by mouth every 4 (four) hours as needed for Pain.    PREDNISONE (DELTASONE) 50 MG TAB    Take 1 tablet (50 mg total) by mouth once daily.    VALACYCLOVIR (VALTREX) 1000 MG TABLET    Take 1 tablet (1,000 mg total) by mouth 3 (three) times daily.       Follow-up Information     Kevin Escamilla MD In 2 days.    Specialty:  Family Medicine  Contact information:  [address] [facility] [address] [phone]                     Medical Decision Making    Medical Decision Making:   Clinical Tests:   Radiological Study: Ordered and Reviewed           Scribe Attestation:   Scribe #1: I performed the above scribed service and the documentation accurately describes the services I performed. I attest to the accuracy of the note.    Attending:   Physician Attestation Statement for Scribe #1: I, Issac Landry MD, personally performed the services described in this documentation, as scribed by Alva Jasso, in my presence, and it is both accurate and complete.          Clinical Impression       ICD-10-CM ICD-9-CM   1. Herpes zoster without complication B02.9 053.9   2. Left hip pain M25.552 719.45       Disposition:   Disposition: Discharged  Condition: Stable         Issac Landry MD  05/03/18 2447

## 2018-05-08 ENCOUNTER — TELEPHONE (OUTPATIENT)
Dept: INTERNAL MEDICINE | Facility: CLINIC | Age: 83
End: 2018-05-08

## 2018-05-08 ENCOUNTER — OFFICE VISIT (OUTPATIENT)
Dept: INTERNAL MEDICINE | Facility: CLINIC | Age: 83
End: 2018-05-08
Payer: MEDICARE

## 2018-05-08 VITALS
HEIGHT: 62 IN | SYSTOLIC BLOOD PRESSURE: 138 MMHG | OXYGEN SATURATION: 99 % | HEART RATE: 68 BPM | DIASTOLIC BLOOD PRESSURE: 64 MMHG | TEMPERATURE: 98 F | BODY MASS INDEX: 17.37 KG/M2 | WEIGHT: 94.38 LBS

## 2018-05-08 DIAGNOSIS — K59.00 CONSTIPATION, UNSPECIFIED CONSTIPATION TYPE: ICD-10-CM

## 2018-05-08 DIAGNOSIS — B02.8 HERPES ZOSTER WITH COMPLICATION: Primary | ICD-10-CM

## 2018-05-08 DIAGNOSIS — R29.6 FALLS FREQUENTLY: ICD-10-CM

## 2018-05-08 PROCEDURE — 99499 UNLISTED E&M SERVICE: CPT | Mod: S$PBB,,, | Performed by: FAMILY MEDICINE

## 2018-05-08 PROCEDURE — 99213 OFFICE O/P EST LOW 20 MIN: CPT | Mod: S$GLB,,, | Performed by: FAMILY MEDICINE

## 2018-05-08 PROCEDURE — 99999 PR PBB SHADOW E&M-EST. PATIENT-LVL III: CPT | Mod: PBBFAC,,, | Performed by: FAMILY MEDICINE

## 2018-05-08 RX ORDER — TRAMADOL HYDROCHLORIDE 50 MG/1
25 TABLET ORAL EVERY 12 HOURS PRN
Qty: 12 TABLET | Refills: 0 | Status: SHIPPED | OUTPATIENT
Start: 2018-05-08 | End: 2018-05-14 | Stop reason: SDUPTHER

## 2018-05-08 RX ORDER — POLYETHYLENE GLYCOL 3350 17 G/17G
7 POWDER, FOR SOLUTION ORAL DAILY
Qty: 14 PACKET | Refills: 0 | Status: SHIPPED | OUTPATIENT
Start: 2018-05-08 | End: 2019-03-07

## 2018-05-08 RX ORDER — MUPIROCIN 20 MG/G
OINTMENT TOPICAL 3 TIMES DAILY
Qty: 1 TUBE | Refills: 1 | Status: SHIPPED | OUTPATIENT
Start: 2018-05-08 | End: 2018-05-14 | Stop reason: SDUPTHER

## 2018-05-08 NOTE — PROGRESS NOTES
Subjective:       Patient ID: Lauren Le is a 89 y.o. female.    Chief Complaint: Herpes Zoster    HPI     89 yo F    Last week was found to have shingles at ER. 5/3/18    L hip pain -   X-ray obtained   negative for fracture.    Found to have shingles.  Valtrex.  Currently taking TID.     Taking Norco - taking at night.   Finds she is falling.  Balance is off.    Took the pain pill last night.   Finding she is falling more.  Reports she is getting dizzy.  Reports constipation.    Review of Systems   Constitutional: Negative for chills and fever.   HENT: Negative for congestion, sinus pressure and voice change.    Eyes: Negative for visual disturbance.   Respiratory: Negative for shortness of breath.    Cardiovascular: Negative for chest pain.   Gastrointestinal: Positive for constipation. Negative for diarrhea.   Genitourinary: Negative for difficulty urinating.   Musculoskeletal: Negative for gait problem and myalgias.   Skin: Negative for rash.   Neurological: Negative for dizziness and light-headedness.   Psychiatric/Behavioral: Negative for dysphoric mood.       Objective:       Vitals:    05/08/18 0954   BP: 138/64   Pulse: 68   Temp: 98.2 °F (36.8 °C)       Physical Exam   Constitutional: She is oriented to person, place, and time. She appears well-developed and well-nourished. She does not have a sickly appearance. No distress.   HENT:   Head: Normocephalic and atraumatic.   Right Ear: External ear normal.   Left Ear: External ear normal.   Eyes: Conjunctivae, EOM and lids are normal.   Neck: Trachea normal, normal range of motion and full passive range of motion without pain.   Cardiovascular: Normal rate, regular rhythm and normal heart sounds.    Pulmonary/Chest: Effort normal and breath sounds normal. No stridor. No respiratory distress.   Abdominal: Soft. Normal appearance and bowel sounds are normal. She exhibits no distension. There is no tenderness. There is no guarding. No hernia.    Musculoskeletal: Normal range of motion.   Lymphadenopathy:     She has no cervical adenopathy.   Neurological: She is alert and oriented to person, place, and time. She is not disoriented.   Skin: Skin is warm, dry and intact. No rash noted. She is not diaphoretic.   Noted vesicular erythematous rash across left lower back going down leg.  No surrounding erythema acute tenderness.    Psychiatric: She has a normal mood and affect. Her speech is normal and behavior is normal. Thought content normal.       Assessment:       1. Herpes zoster with complication    2. Falls frequently    3. Constipation, unspecified constipation type        Plan:   Herpes zoster with complication    Encourage continue valtrex.  Stopping Norco - concerned contributing to constipation and balance issues - with falls.   Monitor closely for bacterial superinfection.  bactroban topical  Home health to be obtained to monitor for infection - local wound care    Recurrent falls  I have concerns falls maybe 2'2 to pain and pain medication.  Holding opioid therapy.  Will prescribe low dose tramadol ( to be cut in half ) if acetaminophen does not cover pain adequately.  Avoiding NSAID as can be done to avoid decline in renal function ( patient has CKD ).     Constipation  Suspect 2'2 to opioid.  Stool softener  Hydration  miralax  Fruits/ vegetables.     F/u in 2 weeks.     No Follow-up on file.

## 2018-05-08 NOTE — TELEPHONE ENCOUNTER
----- Message from Suzy Sheth sent at 5/8/2018 11:04 AM CDT -----  Contact: [facility]  request a call concerning a med change on Miralax, no additional info given, can be reached at [phone] ///[email or web address]

## 2018-05-09 ENCOUNTER — TELEPHONE (OUTPATIENT)
Dept: INTERNAL MEDICINE | Facility: CLINIC | Age: 83
End: 2018-05-09

## 2018-05-09 NOTE — TELEPHONE ENCOUNTER
"Pt home health nurse wants to know if she should do specific wound care states only orders she have is to monitor it, She is also requesting order for home health aide to help with bath.       Home Health Nurse : Melissa "[facility]"                                      [phone]  "

## 2018-05-09 NOTE — TELEPHONE ENCOUNTER
----- Message from Chely Lizama sent at 5/9/2018  1:39 PM CDT -----  Contact: alcira groves/nephew/care giver [phone]  States that he is calling to check status on request to change rx for glycolax powder to glycolax liquid. Please call back at [phone]//thank you acc

## 2018-05-09 NOTE — TELEPHONE ENCOUNTER
Spoke to pt caregiver about prescription he verbalized understanding. Called pts pharmacy spoke to pharmacist Yvan Mera verified dosage change issue resolved.

## 2018-05-14 ENCOUNTER — TELEPHONE (OUTPATIENT)
Dept: INTERNAL MEDICINE | Facility: CLINIC | Age: 83
End: 2018-05-14

## 2018-05-14 RX ORDER — MUPIROCIN 20 MG/G
OINTMENT TOPICAL 3 TIMES DAILY
Qty: 1 TUBE | Refills: 2 | Status: SHIPPED | OUTPATIENT
Start: 2018-05-14 | End: 2019-03-07

## 2018-05-14 RX ORDER — TRAMADOL HYDROCHLORIDE 50 MG/1
25 TABLET ORAL EVERY 8 HOURS PRN
Qty: 20 TABLET | Refills: 0 | Status: SHIPPED | OUTPATIENT
Start: 2018-05-14 | End: 2018-05-24

## 2018-05-14 NOTE — TELEPHONE ENCOUNTER
Spoke with pts nephbill Gillis. He is requesting a regill on Bactroban ointment. He has used one tube and almost gone through he 2nd tube. States he is using her her buttock and BLE where shingles is present. Also states that pt is still in pain when taking Tramadol, requesting a new rx. She is taking 0.5 tab BID.

## 2018-05-14 NOTE — TELEPHONE ENCOUNTER
----- Message from Anna Burton sent at 5/14/2018  1:27 PM CDT -----  Contact: alcira/nephbill  Please give pt nephew a call at [phone] regarding more of the shingle medication being called in and also states that the pain medication isn't working.       ...  [facility] - [place] - [address]  [address]  [place] [address]  Phone: [phone] Fax: [phone]

## 2018-05-15 NOTE — TELEPHONE ENCOUNTER
----- Message from Olman Cleveland sent at 5/15/2018 11:53 AM CDT -----  Contact: MarizaEmilsmeagan [facility]-[phone]   Would like home Aid orders 2 times weekly for 4 weeks starting this week if possible.  Please fax order to [phone].  Please call back at [phone].  x-AH

## 2018-05-17 RX ORDER — VALACYCLOVIR HYDROCHLORIDE 1 G/1
1000 TABLET, FILM COATED ORAL 3 TIMES DAILY
Qty: 21 TABLET | Refills: 0 | OUTPATIENT
Start: 2018-05-17 | End: 2018-05-24

## 2018-05-21 NOTE — TELEPHONE ENCOUNTER
----- Message from Gregoria Cardona sent at 5/21/2018  8:58 AM CDT -----  Michelle with [facility] at [phone]//states pt has not had a bowel movement in a week//she was given Miralax for last 3 days and still no bowel movement//please call//itzel/paddy

## 2018-05-21 NOTE — TELEPHONE ENCOUNTER
Spoke with Michelle, pt has not had a BM even with glycolax. Has appt with Dr. Escamilla tomorrow am.

## 2018-05-22 ENCOUNTER — OFFICE VISIT (OUTPATIENT)
Dept: INTERNAL MEDICINE | Facility: CLINIC | Age: 83
End: 2018-05-22
Payer: MEDICARE

## 2018-05-22 VITALS
HEART RATE: 67 BPM | BODY MASS INDEX: 16.6 KG/M2 | DIASTOLIC BLOOD PRESSURE: 58 MMHG | HEIGHT: 62 IN | OXYGEN SATURATION: 97 % | TEMPERATURE: 98 F | SYSTOLIC BLOOD PRESSURE: 146 MMHG | WEIGHT: 90.19 LBS

## 2018-05-22 DIAGNOSIS — B02.8 HERPES ZOSTER WITH COMPLICATION: ICD-10-CM

## 2018-05-22 DIAGNOSIS — K59.00 CONSTIPATION, UNSPECIFIED CONSTIPATION TYPE: Primary | ICD-10-CM

## 2018-05-22 PROCEDURE — 99999 PR PBB SHADOW E&M-EST. PATIENT-LVL IV: CPT | Mod: PBBFAC,,, | Performed by: FAMILY MEDICINE

## 2018-05-22 PROCEDURE — 99213 OFFICE O/P EST LOW 20 MIN: CPT | Mod: S$GLB,,, | Performed by: FAMILY MEDICINE

## 2018-05-22 NOTE — PROGRESS NOTES
"Subjective:       Patient ID: Lauren Le is a 89 y.o. female.    Chief Complaint: Constipation    HPI     89 yo F      Recent shingles    Finished vatlrex    Taking tramadol for pain - which has been severe -   Pain is improving a " little " per patient.  Nephew reports it is better - she is not hollering like she was.    Constipation.  No BM this week.  She is passing wind.    Has had suppository one time  No relief  Has tried miralax    Stopped tramadol - no dose since yesterday.      Review of Systems   Constitutional: Negative for chills and fever.   HENT: Negative for congestion, sinus pressure and voice change.    Eyes: Negative for visual disturbance.   Respiratory: Negative for shortness of breath.    Cardiovascular: Negative for chest pain.   Gastrointestinal: Negative for constipation and diarrhea.   Genitourinary: Negative for difficulty urinating.   Musculoskeletal: Negative for gait problem and myalgias.   Skin: Negative for rash.   Neurological: Negative for dizziness and light-headedness.   Psychiatric/Behavioral: Negative for dysphoric mood.       Objective:       Vitals:    05/22/18 1025   BP: (!) 146/58   Pulse: 67   Temp: 97.9 °F (36.6 °C)       Physical Exam   Constitutional: She is oriented to person, place, and time. She appears well-developed and well-nourished. She does not have a sickly appearance. No distress.   HENT:   Head: Normocephalic and atraumatic.   Right Ear: External ear normal.   Left Ear: External ear normal.   Eyes: Conjunctivae, EOM and lids are normal.   Neck: Trachea normal, normal range of motion and full passive range of motion without pain.   Cardiovascular: Normal rate, regular rhythm and normal heart sounds.    Pulmonary/Chest: Effort normal and breath sounds normal. No stridor. No respiratory distress.   Abdominal: Soft. Normal appearance and bowel sounds are normal. She exhibits no distension. There is no tenderness. There is no guarding. No hernia.   Stool " appreciated in rectum - hard/ small stones roughly [id number].  No large evacuation possible with manual exam   Musculoskeletal: Normal range of motion.   Lymphadenopathy:     She has no cervical adenopathy.   Neurological: She is alert and oriented to person, place, and time. She is not disoriented.   Skin: Skin is warm, dry and intact. No rash noted. She is not diaphoretic.   Psychiatric: She has a normal mood and affect. Her speech is normal and behavior is normal. Thought content normal.       Assessment:       1. Constipation, unspecified constipation type    2. Herpes zoster with complication        Plan:   Constipation, unspecified constipation type    This is a chronic problem. Seems acutely worsened.  Likely 2'2 to pain medication. Advising cessation of tramadol  Has used suppositories in past.  Advising enema today  KUB today  Continue polyethylene glycol  Digital rectal exam performed in attempt to manually disimpact.  Firm round stools appreciated. Attempted to break apart with my finger. No singular large stool appreciated.  Will alert me if fails to have BM.  Spoke to [facility] - nursing may need to assist enema         Herpes zoster with complication  Appears to be healing.  Pain to be addressed with acetaminophen and NSAID  No further use of tramadol      No Follow-up on file.

## 2018-05-23 ENCOUNTER — HOSPITAL ENCOUNTER (OUTPATIENT)
Dept: RADIOLOGY | Facility: HOSPITAL | Age: 83
Discharge: HOME | End: 2018-05-23
Attending: FAMILY MEDICINE
Payer: MEDICARE

## 2018-05-23 ENCOUNTER — TELEPHONE (OUTPATIENT)
Dept: INTERNAL MEDICINE | Facility: CLINIC | Age: 83
End: 2018-05-23

## 2018-05-23 DIAGNOSIS — K59.00 CONSTIPATION, UNSPECIFIED CONSTIPATION TYPE: ICD-10-CM

## 2018-05-23 PROCEDURE — 74018 RADEX ABDOMEN 1 VIEW: CPT | Mod: 26,,, | Performed by: RADIOLOGY

## 2018-05-23 PROCEDURE — 74018 RADEX ABDOMEN 1 VIEW: CPT | Mod: TC

## 2018-05-23 NOTE — TELEPHONE ENCOUNTER
----- Message from Anna Burton sent at 5/23/2018  8:37 AM CDT -----  Contact: Pt/Caregiver (carlin0  Caller states that pt didn't have a reaction and if there are any questions give Carlin a call at .[phone] (home)

## 2018-05-23 NOTE — TELEPHONE ENCOUNTER
Mr. Gillis states this morning that they did the enema and suppository last night for the constipation and nothing has happened. He is waiting for further instructions. She did not get the xray. They are coming this afternoon about 1:30 pm to do that

## 2018-06-07 ENCOUNTER — TELEPHONE (OUTPATIENT)
Dept: INTERNAL MEDICINE | Facility: CLINIC | Age: 83
End: 2018-06-07

## 2018-06-07 RX ORDER — GABAPENTIN 300 MG/1
300 CAPSULE ORAL 3 TIMES DAILY
Qty: 90 CAPSULE | Refills: 1 | Status: SHIPPED | OUTPATIENT
Start: 2018-06-07 | End: 2018-07-26 | Stop reason: SDUPTHER

## 2018-06-07 NOTE — TELEPHONE ENCOUNTER
"[facility] (Phone #[phone]) stating that "Pt was told by home health nurses dose on Gabapentin was supposed to be increased. Please send new Rx."     Please advise.    "

## 2018-06-08 NOTE — TELEPHONE ENCOUNTER
Spoke to Rody, the patient's caregiver, informing her that the requested Gabapentin dosage was sent to the patient's pharmacy. Call ended well.

## 2018-06-15 ENCOUNTER — TELEPHONE (OUTPATIENT)
Dept: ADMINISTRATIVE | Facility: CLINIC | Age: 83
End: 2018-06-15

## 2018-06-15 ENCOUNTER — TELEPHONE (OUTPATIENT)
Dept: INTERNAL MEDICINE | Facility: CLINIC | Age: 83
End: 2018-06-15

## 2018-06-15 NOTE — TELEPHONE ENCOUNTER
[facility] was calling to see if the could continue the PT.  She has some unmet goals that they would like to continue working on    They would like to see her 2 times a week.  They are requesting another 3 weeks.   The contact number [phone]

## 2018-06-15 NOTE — PROGRESS NOTES
Home Health SOC with [facility] - Dr. Kevin Escamilla. Patient received SN,PT,OT,HHA services.

## 2018-06-18 RX ORDER — LATANOPROST 50 UG/ML
1 SOLUTION/ DROPS OPHTHALMIC NIGHTLY
Qty: 2.5 ML | Refills: 11 | Status: SHIPPED | OUTPATIENT
Start: 2018-06-18 | End: 2019-06-18

## 2018-07-13 RX ORDER — LEVOTHYROXINE SODIUM 125 UG/1
125 TABLET ORAL DAILY
Qty: 90 TABLET | Refills: 0 | Status: SHIPPED | OUTPATIENT
Start: 2018-07-13 | End: 2018-10-03 | Stop reason: SDUPTHER

## 2018-07-26 RX ORDER — GABAPENTIN 300 MG/1
300 CAPSULE ORAL 3 TIMES DAILY
Qty: 90 CAPSULE | Refills: 1 | Status: SHIPPED | OUTPATIENT
Start: 2018-07-26 | End: 2018-09-10 | Stop reason: SDUPTHER

## 2018-07-31 ENCOUNTER — TELEPHONE (OUTPATIENT)
Dept: INTERNAL MEDICINE | Facility: CLINIC | Age: 83
End: 2018-07-31

## 2018-08-16 RX ORDER — LOSARTAN POTASSIUM 100 MG/1
100 TABLET ORAL DAILY
Qty: 90 TABLET | Refills: 0 | Status: SHIPPED | OUTPATIENT
Start: 2018-08-16 | End: 2018-10-22 | Stop reason: SDUPTHER

## 2018-09-10 RX ORDER — GABAPENTIN 300 MG/1
300 CAPSULE ORAL 3 TIMES DAILY
Qty: 90 CAPSULE | Refills: 1 | Status: SHIPPED | OUTPATIENT
Start: 2018-09-10 | End: 2018-11-06 | Stop reason: SDUPTHER

## 2018-10-04 RX ORDER — LEVOTHYROXINE SODIUM 125 UG/1
125 TABLET ORAL DAILY
Qty: 90 TABLET | Refills: 0 | Status: SHIPPED | OUTPATIENT
Start: 2018-10-04 | End: 2018-11-29 | Stop reason: SDUPTHER

## 2018-10-12 ENCOUNTER — OFFICE VISIT (OUTPATIENT)
Dept: INTERNAL MEDICINE | Facility: CLINIC | Age: 83
End: 2018-10-12
Payer: MEDICARE

## 2018-10-12 VITALS
OXYGEN SATURATION: 98 % | SYSTOLIC BLOOD PRESSURE: 138 MMHG | HEART RATE: 63 BPM | BODY MASS INDEX: 17.48 KG/M2 | HEIGHT: 62 IN | DIASTOLIC BLOOD PRESSURE: 60 MMHG | WEIGHT: 95 LBS | TEMPERATURE: 97 F

## 2018-10-12 DIAGNOSIS — J40 BRONCHITIS: Primary | ICD-10-CM

## 2018-10-12 PROCEDURE — 99214 OFFICE O/P EST MOD 30 MIN: CPT | Mod: S$PBB,,, | Performed by: PHYSICIAN ASSISTANT

## 2018-10-12 PROCEDURE — 99999 PR PBB SHADOW E&M-EST. PATIENT-LVL IV: CPT | Mod: PBBFAC,,, | Performed by: PHYSICIAN ASSISTANT

## 2018-10-12 PROCEDURE — 1101F PT FALLS ASSESS-DOCD LE1/YR: CPT | Mod: CPTII,,, | Performed by: PHYSICIAN ASSISTANT

## 2018-10-12 PROCEDURE — 99214 OFFICE O/P EST MOD 30 MIN: CPT | Mod: PBBFAC,25 | Performed by: PHYSICIAN ASSISTANT

## 2018-10-12 PROCEDURE — 96372 THER/PROPH/DIAG INJ SC/IM: CPT | Mod: PBBFAC

## 2018-10-12 RX ORDER — LEVOCETIRIZINE DIHYDROCHLORIDE 5 MG/1
5 TABLET, FILM COATED ORAL NIGHTLY
Qty: 30 TABLET | Refills: 0 | Status: SHIPPED | OUTPATIENT
Start: 2018-10-12 | End: 2018-11-13 | Stop reason: SDUPTHER

## 2018-10-12 RX ORDER — METHYLPREDNISOLONE ACETATE 40 MG/ML
40 INJECTION, SUSPENSION INTRA-ARTICULAR; INTRALESIONAL; INTRAMUSCULAR; SOFT TISSUE
Status: COMPLETED | OUTPATIENT
Start: 2018-10-12 | End: 2018-10-12

## 2018-10-12 RX ORDER — BENZONATATE 200 MG/1
200 CAPSULE ORAL 3 TIMES DAILY PRN
Qty: 30 CAPSULE | Refills: 1 | Status: SHIPPED | OUTPATIENT
Start: 2018-10-12 | End: 2018-10-22

## 2018-10-12 RX ADMIN — METHYLPREDNISOLONE ACETATE 40 MG: 40 INJECTION, SUSPENSION INTRALESIONAL; INTRAMUSCULAR; INTRASYNOVIAL; SOFT TISSUE at 02:10

## 2018-10-12 NOTE — PROGRESS NOTES
Subjective:       Patient ID: Lauren Le is a 89 y.o. female.    Chief Complaint: Cough (PCP - Francine  with nephew - Carlin Brand)    Cough   This is a new problem. The current episode started 1 to 4 weeks ago. The problem has been unchanged. The problem occurs every few minutes. The cough is non-productive. Pertinent negatives include no chest pain, chills, ear congestion, fever, headaches, nasal congestion, postnasal drip, rhinorrhea, shortness of breath or wheezing. Nothing aggravates the symptoms. She has tried nothing for the symptoms. The treatment provided no relief.     Past Medical History:   Diagnosis Date    Anemia     Anxiety     Depression     Diabetes mellitus  01/2014    borderline    DM (diabetes mellitus) 01/2014     Borderline BS doesn't check 01/31/2018    DM2 (diabetes mellitus, type 2)     Glaucoma     HTN (hypertension)     Hypothyroidism        Review of Systems   Constitutional: Negative for chills and fever.   HENT: Negative for congestion, nosebleeds, postnasal drip, rhinorrhea and sinus pressure.    Respiratory: Positive for cough. Negative for shortness of breath and wheezing.    Cardiovascular: Negative for chest pain and palpitations.   Gastrointestinal: Negative for abdominal pain.   Neurological: Negative for headaches.       Objective:      Physical Exam   Constitutional: She appears well-developed and well-nourished. No distress.   HENT:   Head: Normocephalic and atraumatic.   Right Ear: Tympanic membrane and ear canal normal.   Left Ear: Ear canal normal. A middle ear effusion is present.   Nose: Mucosal edema and rhinorrhea present.   Mouth/Throat: Uvula is midline, oropharynx is clear and moist and mucous membranes are normal. No tonsillar exudate.   Neck: Neck supple.   Cardiovascular: Normal rate and regular rhythm. Exam reveals no gallop and no friction rub.   No murmur heard.  Pulmonary/Chest: Effort normal. No stridor. No respiratory distress. She has wheezes.  She has no rales. She exhibits no tenderness.   Abdominal: Soft. There is no tenderness.   Lymphadenopathy:     She has no cervical adenopathy.   Skin: She is not diaphoretic.   Nursing note and vitals reviewed.      Assessment:       1. Bronchitis        Plan:       Bronchitis    Other orders  -     methylPREDNISolone acetate injection 40 mg; Inject 1 mL (40 mg total) into the muscle one time.  -     benzonatate (TESSALON) 200 MG capsule; Take 1 capsule (200 mg total) by mouth 3 (three) times daily as needed for Cough.  Dispense: 30 capsule; Refill: 1  -     levocetirizine (XYZAL) 5 MG tablet; Take 1 tablet (5 mg total) by mouth every evening. Take one at bed time  Dispense: 30 tablet; Refill: 0

## 2018-10-12 NOTE — PROGRESS NOTES
Pt received methylprednisolone  40mg. Inj. Tolerated well to left hip.Pt advised to wait 15 mins after inj. Pt voiced understanding.

## 2018-10-22 RX ORDER — LOSARTAN POTASSIUM 100 MG/1
100 TABLET ORAL DAILY
Qty: 90 TABLET | Refills: 0 | Status: SHIPPED | OUTPATIENT
Start: 2018-10-22 | End: 2019-01-24 | Stop reason: SDUPTHER

## 2018-11-06 RX ORDER — GABAPENTIN 300 MG/1
300 CAPSULE ORAL 3 TIMES DAILY
Qty: 90 CAPSULE | Refills: 1 | Status: SHIPPED | OUTPATIENT
Start: 2018-11-06 | End: 2019-03-07

## 2018-11-12 RX ORDER — LEVOCETIRIZINE DIHYDROCHLORIDE 5 MG/1
5 TABLET, FILM COATED ORAL NIGHTLY
Qty: 30 TABLET | Refills: 0 | OUTPATIENT
Start: 2018-11-12 | End: 2019-11-12

## 2018-11-13 RX ORDER — LEVOCETIRIZINE DIHYDROCHLORIDE 5 MG/1
5 TABLET, FILM COATED ORAL NIGHTLY
Qty: 30 TABLET | Refills: 0 | Status: SHIPPED | OUTPATIENT
Start: 2018-11-13 | End: 2018-12-10 | Stop reason: SDUPTHER

## 2018-11-29 RX ORDER — LEVOTHYROXINE SODIUM 125 UG/1
125 TABLET ORAL DAILY
Qty: 90 TABLET | Refills: 0 | Status: SHIPPED | OUTPATIENT
Start: 2018-11-29 | End: 2019-03-19 | Stop reason: SDUPTHER

## 2018-12-04 RX ORDER — AMLODIPINE BESYLATE 10 MG/1
10 TABLET ORAL DAILY
Qty: 90 TABLET | Refills: 1 | Status: SHIPPED | OUTPATIENT
Start: 2018-12-04

## 2018-12-11 RX ORDER — LEVOCETIRIZINE DIHYDROCHLORIDE 5 MG/1
5 TABLET, FILM COATED ORAL NIGHTLY
Qty: 90 TABLET | Refills: 3 | Status: SHIPPED | OUTPATIENT
Start: 2018-12-11 | End: 2019-12-11

## 2019-01-03 RX ORDER — LEVOTHYROXINE SODIUM 125 UG/1
125 TABLET ORAL DAILY
Qty: 90 TABLET | Refills: 0 | Status: CANCELLED | OUTPATIENT
Start: 2019-01-03

## 2019-01-25 RX ORDER — METOPROLOL SUCCINATE 50 MG/1
50 TABLET, EXTENDED RELEASE ORAL DAILY
Qty: 90 TABLET | Refills: 1 | Status: SHIPPED | OUTPATIENT
Start: 2019-01-25

## 2019-01-25 RX ORDER — LOSARTAN POTASSIUM 100 MG/1
100 TABLET ORAL DAILY
Qty: 90 TABLET | Refills: 0 | Status: SHIPPED | OUTPATIENT
Start: 2019-01-25

## 2019-02-06 NOTE — TELEPHONE ENCOUNTER
Returned call to patient's son. Patient requesting a refill of Tradjenta.  LOV: 10/12/18 (Mr Santoro), 05/22/18 (Dr Escamilla)

## 2019-02-06 NOTE — TELEPHONE ENCOUNTER
----- Message from Melanie Rodríguez sent at 2/6/2019  1:07 PM CST -----  Contact: care-giver(Carlin)-[phone]  Would like to consult with nurse regarding medication refill, pharmacy has been trying to reach out to staff and no respond. Please call back at [phone]. Thanks/ar

## 2019-03-05 RX ORDER — SERTRALINE HYDROCHLORIDE 25 MG/1
25 TABLET, FILM COATED ORAL DAILY
Qty: 90 TABLET | Refills: 3 | Status: SHIPPED | OUTPATIENT
Start: 2019-03-05

## 2019-03-07 ENCOUNTER — OFFICE VISIT (OUTPATIENT)
Dept: INTERNAL MEDICINE | Facility: CLINIC | Age: 84
End: 2019-03-07
Payer: MEDICARE

## 2019-03-07 ENCOUNTER — LAB VISIT (OUTPATIENT)
Dept: LAB | Facility: HOSPITAL | Age: 84
End: 2019-03-07
Attending: FAMILY MEDICINE
Payer: MEDICARE

## 2019-03-07 VITALS
SYSTOLIC BLOOD PRESSURE: 164 MMHG | TEMPERATURE: 97 F | BODY MASS INDEX: 17.85 KG/M2 | RESPIRATION RATE: 20 BRPM | WEIGHT: 97 LBS | OXYGEN SATURATION: 99 % | DIASTOLIC BLOOD PRESSURE: 58 MMHG | HEART RATE: 59 BPM | HEIGHT: 62 IN

## 2019-03-07 DIAGNOSIS — I10 HYPERTENSION, UNSPECIFIED TYPE: ICD-10-CM

## 2019-03-07 DIAGNOSIS — E03.9 HYPOTHYROIDISM, UNSPECIFIED TYPE: ICD-10-CM

## 2019-03-07 DIAGNOSIS — Z79.899 ENCOUNTER FOR LONG-TERM (CURRENT) USE OF MEDICATIONS: ICD-10-CM

## 2019-03-07 DIAGNOSIS — F32.9 MAJOR DEPRESSION, CHRONIC: ICD-10-CM

## 2019-03-07 DIAGNOSIS — E11.49 TYPE II DIABETES MELLITUS WITH NEUROLOGICAL MANIFESTATIONS: ICD-10-CM

## 2019-03-07 DIAGNOSIS — R54 ADVANCED AGE: ICD-10-CM

## 2019-03-07 DIAGNOSIS — I10 HYPERTENSION, UNSPECIFIED TYPE: Primary | ICD-10-CM

## 2019-03-07 LAB
ALBUMIN SERPL BCP-MCNC: 4 G/DL
ALP SERPL-CCNC: 111 U/L
ALT SERPL W/O P-5'-P-CCNC: 21 U/L
ANION GAP SERPL CALC-SCNC: 8 MMOL/L
AST SERPL-CCNC: 26 U/L
BASOPHILS # BLD AUTO: 0.1 K/UL
BASOPHILS NFR BLD: 1 %
BILIRUB SERPL-MCNC: 0.5 MG/DL
BUN SERPL-MCNC: 34 MG/DL
CALCIUM SERPL-MCNC: 10.7 MG/DL
CHLORIDE SERPL-SCNC: 104 MMOL/L
CHOLEST SERPL-MCNC: 232 MG/DL
CHOLEST/HDLC SERPL: 3.1 {RATIO}
CO2 SERPL-SCNC: 28 MMOL/L
CREAT SERPL-MCNC: 1.1 MG/DL
DIFFERENTIAL METHOD: ABNORMAL
EOSINOPHIL # BLD AUTO: 0.8 K/UL
EOSINOPHIL NFR BLD: 7.3 %
ERYTHROCYTE [DISTWIDTH] IN BLOOD BY AUTOMATED COUNT: 13.1 %
EST. GFR  (AFRICAN AMERICAN): 47.3 ML/MIN/1.73 M^2
EST. GFR  (NON AFRICAN AMERICAN): 41 ML/MIN/1.73 M^2
GLUCOSE SERPL-MCNC: 98 MG/DL
HCT VFR BLD AUTO: 37.4 %
HDLC SERPL-MCNC: 74 MG/DL
HDLC SERPL: 31.9 %
HGB BLD-MCNC: 11.8 G/DL
IMM GRANULOCYTES # BLD AUTO: 0.06 K/UL
IMM GRANULOCYTES NFR BLD AUTO: 0.6 %
LDLC SERPL CALC-MCNC: 127.4 MG/DL
LYMPHOCYTES # BLD AUTO: 3.4 K/UL
LYMPHOCYTES NFR BLD: 32.7 %
MCH RBC QN AUTO: 30.3 PG
MCHC RBC AUTO-ENTMCNC: 31.6 G/DL
MCV RBC AUTO: 96 FL
MONOCYTES # BLD AUTO: 0.9 K/UL
MONOCYTES NFR BLD: 8.4 %
NEUTROPHILS # BLD AUTO: 5.3 K/UL
NEUTROPHILS NFR BLD: 50 %
NONHDLC SERPL-MCNC: 158 MG/DL
NRBC BLD-RTO: 0 /100 WBC
PLATELET # BLD AUTO: 247 K/UL
PMV BLD AUTO: 10.7 FL
POTASSIUM SERPL-SCNC: 4.9 MMOL/L
PROT SERPL-MCNC: 8.1 G/DL
RBC # BLD AUTO: 3.89 M/UL
SODIUM SERPL-SCNC: 140 MMOL/L
TRIGL SERPL-MCNC: 153 MG/DL
TSH SERPL DL<=0.005 MIU/L-ACNC: 1.74 UIU/ML
WBC # BLD AUTO: 10.47 K/UL

## 2019-03-07 PROCEDURE — 99999 PR PBB SHADOW E&M-EST. PATIENT-LVL IV: ICD-10-PCS | Mod: PBBFAC,HCNC,, | Performed by: FAMILY MEDICINE

## 2019-03-07 PROCEDURE — G0009 PNEUMOCOCCAL POLYSACCHARIDE VACCINE 23-VALENT =>2YO SQ IM: ICD-10-PCS | Mod: HCNC,S$GLB,, | Performed by: FAMILY MEDICINE

## 2019-03-07 PROCEDURE — 80053 COMPREHEN METABOLIC PANEL: CPT | Mod: HCNC

## 2019-03-07 PROCEDURE — 82306 VITAMIN D 25 HYDROXY: CPT | Mod: HCNC

## 2019-03-07 PROCEDURE — 83036 HEMOGLOBIN GLYCOSYLATED A1C: CPT | Mod: HCNC

## 2019-03-07 PROCEDURE — 99499 UNLISTED E&M SERVICE: CPT | Mod: S$GLB,,, | Performed by: FAMILY MEDICINE

## 2019-03-07 PROCEDURE — 99214 PR OFFICE/OUTPT VISIT, EST, LEVL IV, 30-39 MIN: ICD-10-PCS | Mod: 25,HCNC,S$GLB, | Performed by: FAMILY MEDICINE

## 2019-03-07 PROCEDURE — G0009 ADMIN PNEUMOCOCCAL VACCINE: HCPCS | Mod: HCNC,S$GLB,, | Performed by: FAMILY MEDICINE

## 2019-03-07 PROCEDURE — 99499 RISK ADDL DX/OHS AUDIT: ICD-10-PCS | Mod: S$GLB,,, | Performed by: FAMILY MEDICINE

## 2019-03-07 PROCEDURE — 90732 PPSV23 VACC 2 YRS+ SUBQ/IM: CPT | Mod: HCNC,S$GLB,, | Performed by: FAMILY MEDICINE

## 2019-03-07 PROCEDURE — 99214 OFFICE O/P EST MOD 30 MIN: CPT | Mod: 25,HCNC,S$GLB, | Performed by: FAMILY MEDICINE

## 2019-03-07 PROCEDURE — 1101F PT FALLS ASSESS-DOCD LE1/YR: CPT | Mod: HCNC,CPTII,S$GLB, | Performed by: FAMILY MEDICINE

## 2019-03-07 PROCEDURE — 1101F PR PT FALLS ASSESS DOC 0-1 FALLS W/OUT INJ PAST YR: ICD-10-PCS | Mod: HCNC,CPTII,S$GLB, | Performed by: FAMILY MEDICINE

## 2019-03-07 PROCEDURE — 85025 COMPLETE CBC W/AUTO DIFF WBC: CPT | Mod: HCNC

## 2019-03-07 PROCEDURE — 36415 COLL VENOUS BLD VENIPUNCTURE: CPT | Mod: HCNC

## 2019-03-07 PROCEDURE — 84443 ASSAY THYROID STIM HORMONE: CPT | Mod: HCNC

## 2019-03-07 PROCEDURE — 80061 LIPID PANEL: CPT | Mod: HCNC

## 2019-03-07 PROCEDURE — 90732 PNEUMOCOCCAL POLYSACCHARIDE VACCINE 23-VALENT =>2YO SQ IM: ICD-10-PCS | Mod: HCNC,S$GLB,, | Performed by: FAMILY MEDICINE

## 2019-03-07 PROCEDURE — 99999 PR PBB SHADOW E&M-EST. PATIENT-LVL IV: CPT | Mod: PBBFAC,HCNC,, | Performed by: FAMILY MEDICINE

## 2019-03-07 NOTE — PATIENT INSTRUCTIONS
Dr. Escamilla instructions:    Stop gabapentin - this is for pain and you don't have any    Come back in 2 weeks nursing visit for BP check    If high again we will need to add another medication    Aim for a follow in 4 months with me if that pressure is ok.

## 2019-03-07 NOTE — PROGRESS NOTES
Subjective:       Patient ID: Lauren Le is a 89 y.o. female.    Chief Complaint: Annual Exam    HPI     89 yo F    PMH:  Cystocele  Shingles  DM II  Anemia  HTN  Hypothyroidism  Constipation  Depression    Has gained some weight    Mood is good  - morning time is good  - sound like evening time gets rough ( Nephew indicates memory )     Reports appetite good    No pain    Just feels limited in what she can do    No dark stools  No black stools      Feels a little jumpy  Taking gabapentin  No further shingles pain      Review of Systems   Constitutional: Negative for activity change and unexpected weight change.   HENT: Negative for hearing loss, rhinorrhea and trouble swallowing.    Eyes: Negative for discharge and visual disturbance.   Respiratory: Negative for chest tightness and wheezing.    Cardiovascular: Negative for chest pain and palpitations.   Gastrointestinal: Positive for constipation. Negative for blood in stool, diarrhea and vomiting.   Endocrine: Negative for polydipsia and polyuria.   Genitourinary: Negative for difficulty urinating, dysuria, hematuria and menstrual problem.   Musculoskeletal: Negative for arthralgias, joint swelling and neck pain.   Neurological: Positive for weakness. Negative for headaches.   Psychiatric/Behavioral: Positive for confusion. Negative for dysphoric mood.       Objective:       Vitals:    03/07/19 1106   BP: (!) 164/58   Pulse: (!) 59   Resp: 20   Temp: 96.5 °F (35.8 °C)       Physical Exam   Constitutional: She is oriented to person, place, and time. She appears well-developed and well-nourished. She does not have a sickly appearance. No distress.   HENT:   Head: Normocephalic and atraumatic.   Right Ear: External ear normal.   Left Ear: External ear normal.   Eyes: Conjunctivae, EOM and lids are normal.   Neck: Trachea normal, normal range of motion and full passive range of motion without pain.   Cardiovascular: Normal rate and regular rhythm.   Murmur  heard.  Pulmonary/Chest: Effort normal and breath sounds normal. No stridor. No respiratory distress.   Abdominal: Soft. Normal appearance and bowel sounds are normal. She exhibits no distension. There is no tenderness. There is no guarding. No hernia.   Musculoskeletal: Normal range of motion.   Neurological: She is alert and oriented to person, place, and time. She is not disoriented.   Skin: Skin is warm, dry and intact. No rash noted. She is not diaphoretic.   Psychiatric: She has a normal mood and affect. Her speech is normal and behavior is normal. Thought content normal.       Assessment:       1. Hypertension, unspecified type    2. Type II diabetes mellitus with neurological manifestations    3. Advanced age    4. Hypothyroidism, unspecified type    5. Major depression, chronic    6. Encounter for long-term (current) use of medications        Plan:   Hypertension, unspecified type    Elevated today  10 mg amlodipine  50 mg metoprolol  Losartan 100 mg  Still elevated     Reviewed previous pressures  Prior to initiation a 4th BP medication I want a recheck  Will do a nursing visit in next 2 weeks    I would not want to add another BB dose increase given HR on low end already  If need to add another BP med - may consider hydralazaine.  Thiazide would need renal check first.      Type II diabetes mellitus with neurological manifestations    A1c check  Takes  tradjenta 5 mg daily     Advanced age  Recent birthday  Congratulated on 89 status    Hypothyroidism, unspecified type  Takes 125 mcg - synthroid    Major depression, chronic  zoloft 25 mg  Doing well  Reports mood is stable    Long term medication  No pain  Will discontinue gabapentin today .      4 mo f/u if next pressure check is normal  Sooner if high  No Follow-up on file.

## 2019-03-07 NOTE — PROGRESS NOTES
Patient, Lauren Le (MRN #[id number]), presented with a recent Estimated Glumerular Filtration Rate (EGFR) between 30 and 45 consistent with the definition of chronic kidney disease stage 3 - moderate (ICD10 - N18.3).    eGFR if non    Date Value Ref Range Status   04/09/2018 41 (A) >60 mL/min/1.73 m^2 Final     Comment:     Calculation used to obtain the estimated glomerular filtration  rate (eGFR) is the CKD-EPI equation.          The patient's chronic kidney disease stage 3 was monitored, evaluated, addressed and/or treated. This addendum to the medical record is made on 03/07/2019.

## 2019-03-08 LAB
25(OH)D3+25(OH)D2 SERPL-MCNC: 53 NG/ML
ESTIMATED AVG GLUCOSE: 131 MG/DL
HBA1C MFR BLD HPLC: 6.2 %

## 2019-03-12 ENCOUNTER — HOSPITAL ENCOUNTER (EMERGENCY)
Facility: HOSPITAL | Age: 84
Discharge: HOME | End: 2019-03-12
Attending: EMERGENCY MEDICINE
Payer: MEDICARE

## 2019-03-12 ENCOUNTER — TELEPHONE (OUTPATIENT)
Dept: INTERNAL MEDICINE | Facility: CLINIC | Age: 84
End: 2019-03-12

## 2019-03-12 VITALS
BODY MASS INDEX: 17.18 KG/M2 | RESPIRATION RATE: 15 BRPM | SYSTOLIC BLOOD PRESSURE: 165 MMHG | OXYGEN SATURATION: 98 % | TEMPERATURE: 98 F | HEART RATE: 61 BPM | DIASTOLIC BLOOD PRESSURE: 72 MMHG | HEIGHT: 63 IN

## 2019-03-12 DIAGNOSIS — I10 HTN (HYPERTENSION): ICD-10-CM

## 2019-03-12 DIAGNOSIS — K29.70 GASTRITIS, PRESENCE OF BLEEDING UNSPECIFIED, UNSPECIFIED CHRONICITY, UNSPECIFIED GASTRITIS TYPE: Primary | ICD-10-CM

## 2019-03-12 LAB
ALBUMIN SERPL BCP-MCNC: 4.1 G/DL
ALP SERPL-CCNC: 98 U/L
ALT SERPL W/O P-5'-P-CCNC: 17 U/L
AMYLASE SERPL-CCNC: 36 U/L
ANION GAP SERPL CALC-SCNC: 13 MMOL/L
AST SERPL-CCNC: 25 U/L
BACTERIA #/AREA URNS HPF: ABNORMAL /HPF
BASOPHILS # BLD AUTO: 0.07 K/UL
BASOPHILS NFR BLD: 0.8 %
BILIRUB SERPL-MCNC: 0.5 MG/DL
BILIRUB UR QL STRIP: NEGATIVE
BUN SERPL-MCNC: 22 MG/DL
CALCIUM SERPL-MCNC: 10.4 MG/DL
CHLORIDE SERPL-SCNC: 104 MMOL/L
CLARITY UR: CLEAR
CO2 SERPL-SCNC: 25 MMOL/L
COLOR UR: YELLOW
CREAT SERPL-MCNC: 0.9 MG/DL
DIFFERENTIAL METHOD: NORMAL
EOSINOPHIL # BLD AUTO: 0.1 K/UL
EOSINOPHIL NFR BLD: 1.3 %
ERYTHROCYTE [DISTWIDTH] IN BLOOD BY AUTOMATED COUNT: 13.2 %
EST. GFR  (AFRICAN AMERICAN): >60 ML/MIN/1.73 M^2
EST. GFR  (NON AFRICAN AMERICAN): 52 ML/MIN/1.73 M^2
GLUCOSE SERPL-MCNC: 180 MG/DL
GLUCOSE UR QL STRIP: NEGATIVE
HCT VFR BLD AUTO: 37.1 %
HGB BLD-MCNC: 12.5 G/DL
HGB UR QL STRIP: ABNORMAL
HYALINE CASTS #/AREA URNS LPF: 0 /LPF
KETONES UR QL STRIP: ABNORMAL
LEUKOCYTE ESTERASE UR QL STRIP: NEGATIVE
LIPASE SERPL-CCNC: 35 U/L
LYMPHOCYTES # BLD AUTO: 2.3 K/UL
LYMPHOCYTES NFR BLD: 27.3 %
MCH RBC QN AUTO: 30.5 PG
MCHC RBC AUTO-ENTMCNC: 33.7 G/DL
MCV RBC AUTO: 91 FL
MICROSCOPIC COMMENT: ABNORMAL
MONOCYTES # BLD AUTO: 0.6 K/UL
MONOCYTES NFR BLD: 7.5 %
NEUTROPHILS # BLD AUTO: 5.2 K/UL
NEUTROPHILS NFR BLD: 63.1 %
NITRITE UR QL STRIP: NEGATIVE
PH UR STRIP: 7 [PH] (ref 5–8)
PLATELET # BLD AUTO: 226 K/UL
PMV BLD AUTO: 9.8 FL
POTASSIUM SERPL-SCNC: 3.4 MMOL/L
PROT SERPL-MCNC: 8 G/DL
PROT UR QL STRIP: ABNORMAL
RBC # BLD AUTO: 4.1 M/UL
RBC #/AREA URNS HPF: 0 /HPF (ref 0–4)
SODIUM SERPL-SCNC: 142 MMOL/L
SP GR UR STRIP: 1.02 (ref 1–1.03)
TROPONIN I SERPL DL<=0.01 NG/ML-MCNC: 0.03 NG/ML
URN SPEC COLLECT METH UR: ABNORMAL
UROBILINOGEN UR STRIP-ACNC: NEGATIVE EU/DL
WBC # BLD AUTO: 8.24 K/UL
WBC #/AREA URNS HPF: 4 /HPF (ref 0–5)

## 2019-03-12 PROCEDURE — 93010 ELECTROCARDIOGRAM REPORT: CPT | Mod: HCNC,,, | Performed by: INTERNAL MEDICINE

## 2019-03-12 PROCEDURE — 80053 COMPREHEN METABOLIC PANEL: CPT | Mod: HCNC

## 2019-03-12 PROCEDURE — 85025 COMPLETE CBC W/AUTO DIFF WBC: CPT | Mod: HCNC

## 2019-03-12 PROCEDURE — 93010 EKG 12-LEAD: ICD-10-PCS | Mod: HCNC,,, | Performed by: INTERNAL MEDICINE

## 2019-03-12 PROCEDURE — 96375 TX/PRO/DX INJ NEW DRUG ADDON: CPT | Mod: HCNC

## 2019-03-12 PROCEDURE — 25500020 PHARM REV CODE 255: Mod: HCNC | Performed by: EMERGENCY MEDICINE

## 2019-03-12 PROCEDURE — 83690 ASSAY OF LIPASE: CPT | Mod: HCNC

## 2019-03-12 PROCEDURE — P9612 CATHETERIZE FOR URINE SPEC: HCPCS | Mod: HCNC

## 2019-03-12 PROCEDURE — 25000003 PHARM REV CODE 250: Mod: HCNC | Performed by: EMERGENCY MEDICINE

## 2019-03-12 PROCEDURE — 93005 ELECTROCARDIOGRAM TRACING: CPT | Mod: HCNC

## 2019-03-12 PROCEDURE — 82150 ASSAY OF AMYLASE: CPT | Mod: HCNC

## 2019-03-12 PROCEDURE — 99285 EMERGENCY DEPT VISIT HI MDM: CPT | Mod: 25,HCNC

## 2019-03-12 PROCEDURE — 81000 URINALYSIS NONAUTO W/SCOPE: CPT | Mod: HCNC

## 2019-03-12 PROCEDURE — 63600175 PHARM REV CODE 636 W HCPCS: Mod: HCNC | Performed by: EMERGENCY MEDICINE

## 2019-03-12 PROCEDURE — 96374 THER/PROPH/DIAG INJ IV PUSH: CPT | Mod: HCNC

## 2019-03-12 PROCEDURE — 84484 ASSAY OF TROPONIN QUANT: CPT | Mod: HCNC

## 2019-03-12 PROCEDURE — S0028 INJECTION, FAMOTIDINE, 20 MG: HCPCS | Mod: HCNC | Performed by: EMERGENCY MEDICINE

## 2019-03-12 PROCEDURE — 96361 HYDRATE IV INFUSION ADD-ON: CPT | Mod: HCNC

## 2019-03-12 PROCEDURE — 96376 TX/PRO/DX INJ SAME DRUG ADON: CPT | Mod: HCNC

## 2019-03-12 RX ORDER — ONDANSETRON 2 MG/ML
4 INJECTION INTRAMUSCULAR; INTRAVENOUS
Status: COMPLETED | OUTPATIENT
Start: 2019-03-12 | End: 2019-03-12

## 2019-03-12 RX ORDER — FAMOTIDINE 20 MG/1
20 TABLET, FILM COATED ORAL
Status: DISCONTINUED | OUTPATIENT
Start: 2019-03-12 | End: 2019-03-12

## 2019-03-12 RX ORDER — FAMOTIDINE 20 MG/1
20 TABLET, FILM COATED ORAL 2 TIMES DAILY
Qty: 60 TABLET | Refills: 0 | Status: SHIPPED | OUTPATIENT
Start: 2019-03-12 | End: 2019-04-12

## 2019-03-12 RX ORDER — ONDANSETRON 4 MG/1
4 TABLET, FILM COATED ORAL EVERY 6 HOURS PRN
Qty: 60 TABLET | Refills: 0 | Status: SHIPPED | OUTPATIENT
Start: 2019-03-12 | End: 2019-04-12 | Stop reason: SDUPTHER

## 2019-03-12 RX ORDER — FAMOTIDINE 10 MG/ML
20 INJECTION INTRAVENOUS
Status: COMPLETED | OUTPATIENT
Start: 2019-03-12 | End: 2019-03-12

## 2019-03-12 RX ORDER — HYDRALAZINE HYDROCHLORIDE 20 MG/ML
10 INJECTION INTRAMUSCULAR; INTRAVENOUS
Status: COMPLETED | OUTPATIENT
Start: 2019-03-12 | End: 2019-03-12

## 2019-03-12 RX ADMIN — SODIUM CHLORIDE 500 ML: 0.9 INJECTION, SOLUTION INTRAVENOUS at 10:03

## 2019-03-12 RX ADMIN — FAMOTIDINE 20 MG: 10 INJECTION INTRAVENOUS at 01:03

## 2019-03-12 RX ADMIN — LIDOCAINE HYDROCHLORIDE 50 ML: 20 SOLUTION ORAL; TOPICAL at 01:03

## 2019-03-12 RX ADMIN — IOHEXOL 75 ML: 350 INJECTION, SOLUTION INTRAVENOUS at 01:03

## 2019-03-12 RX ADMIN — ONDANSETRON 4 MG: 2 INJECTION INTRAMUSCULAR; INTRAVENOUS at 10:03

## 2019-03-12 RX ADMIN — ONDANSETRON 4 MG: 2 INJECTION INTRAMUSCULAR; INTRAVENOUS at 01:03

## 2019-03-12 RX ADMIN — HYDRALAZINE HYDROCHLORIDE 10 MG: 20 INJECTION INTRAMUSCULAR; INTRAVENOUS at 10:03

## 2019-03-12 NOTE — DISCHARGE INSTRUCTIONS
Pepcid twice daily.  Use Tums for breakthrough symptoms.  Zofran for nausea.  Follow up with Dr. Escamilla.  Call in the morning to reschedule your appointment.  Of note their cysts on both ovaries.  These may require ultrasound further evaluate.  Please follow up with her doctor to schedule this.

## 2019-03-12 NOTE — ED PROVIDER NOTES
SCRIBE #1 NOTE: I, Rukhsana Gillette, am scribing for, and in the presence of, Yury Hernandez Jr., MD. I have scribed the entire note.       History     Chief Complaint   Patient presents with    Abdominal Pain     x3 days +n/v      Review of patient's allergies indicates:  No Known Allergies      History of Present Illness     HPI    3/12/2019, 10:16 AM  History obtained from the patient      History of Present Illness: Lauren Le is a 89 y.o. female patient with a PMHx of DM and HTN who presents to the Emergency Department for evaluation of abd pain which onset gradually 3 days ago. Symptoms are constant and moderate in severity. No mitigating or exacerbating factors reported. Associated sxs include nausea, vomiting, and loss of appetite.  She notes some water brash as well as worsening when lying down.  Patient denies any fever, chills, diarrhea, hematochezia, dysuria, hematuria, flank pain, back pain, CP, SOB, and all other sxs at this time. No prior tx reported. No further complaints or concerns at this time.         Arrival mode: Personal vehicle      PCP: Kevin Escamilla MD        Past Medical History:  Past Medical History:   Diagnosis Date    Anemia     Anxiety     Depression     Diabetes mellitus  01/2014    borderline    DM (diabetes mellitus) 01/2014     Borderline BS doesn't check 01/31/2018    DM2 (diabetes mellitus, type 2)     Glaucoma     HTN (hypertension)     Hypothyroidism        Past Surgical History:  Past Surgical History:   Procedure Laterality Date    bladder lift      TOTAL ABDOMINAL HYSTERECTOMY      for benign indications    TOTAL HIP ARTHROPLASTY      left hip         Family History:  Family History   Problem Relation Age of Onset    Cancer Sister         breast with mets    Diabetes Neg Hx     Heart disease Neg Hx     Kidney disease Neg Hx     Stroke Neg Hx        Social History:  Social History     Tobacco Use    Smoking status: Never Smoker    Smokeless  tobacco: Never Used   Substance and Sexual Activity    Alcohol use: No    Drug use: No    Sexual activity: No     Partners: Male     Birth control/protection: See Surgical Hx        Review of Systems     Review of Systems   Constitutional: Negative for chills and fever.        (+) loss of appetite    HENT: Negative for sore throat.    Respiratory: Negative for shortness of breath.    Cardiovascular: Negative for chest pain.   Gastrointestinal: Positive for abdominal pain, nausea and vomiting. Negative for blood in stool and diarrhea.   Genitourinary: Negative for dysuria, flank pain and hematuria.   Musculoskeletal: Negative for back pain.   Skin: Negative for rash.   Neurological: Negative for weakness.   Hematological: Does not bruise/bleed easily.   All other systems reviewed and are negative.     Physical Exam     Initial Vitals [03/12/19 1002]   BP Pulse Resp Temp SpO2   (!) 217/112 63 15 98 °F (36.7 °C) 98 %      MAP       --          Physical Exam  Nursing Notes and Vital Signs Reviewed.  Constitutional: Patient is in no acute distress. Well-developed and well-nourished.  Head: Atraumatic. Normocephalic.  Eyes: PERRL. EOM intact. Conjunctivae are not pale. No scleral icterus.  ENT: Mucous membranes are moist. Oropharynx is clear and symmetric.    Neck: Supple. Full ROM. No lymphadenopathy.  Cardiovascular: Regular rate. Regular rhythm. Cystolic ejection murmur. No rubs or gallops. Distal pulses are 2+ and symmetric.  Pulmonary/Chest: No respiratory distress. Clear to auscultation bilaterally. No wheezing or rales.  Abdominal: Soft and non-distended.  Mild, epigastric TTP. No rebound, guarding, or rigidity. Diminished bowel sounds.  Genitourinary: No CVA tenderness. No suprapubic tenderness  Musculoskeletal: Moves all extremities. No obvious deformities. No edema. No calf tenderness.  Skin: Warm and dry.  Neurological:  Alert, awake, and appropriate.  Normal speech.  No acute focal neurological deficits are  "appreciated.  Psychiatric: Normal affect. Good eye contact. Appropriate in content.     ED Course   Procedures  ED Vital Signs:  Vitals:    03/12/19 1002 03/12/19 1039 03/12/19 1102 03/12/19 1201   BP: (!) 217/112 (!) 213/84 (!) 177/70 (!) 163/69   Pulse: 63 61     Resp: 15      Temp: 98 °F (36.7 °C)      TempSrc: Oral      SpO2: 98%      Height: 5' 3" (1.6 m)       03/12/19 1232   BP: (!) 165/72   Pulse:    Resp:    Temp:    TempSrc:    SpO2:    Height:        Abnormal Lab Results:  Labs Reviewed   COMPREHENSIVE METABOLIC PANEL - Abnormal; Notable for the following components:       Result Value    Potassium 3.4 (*)     Glucose 180 (*)     eGFR if non  52 (*)     All other components within normal limits   URINALYSIS, REFLEX TO URINE CULTURE - Abnormal; Notable for the following components:    Protein, UA 2+ (*)     Ketones, UA Trace (*)     Occult Blood UA Trace (*)     All other components within normal limits    Narrative:     Preferred Collection Type->Urine, Clean Catch   URINALYSIS MICROSCOPIC - Abnormal; Notable for the following components:    Bacteria, UA Few (*)     All other components within normal limits    Narrative:     Preferred Collection Type->Urine, Clean Catch   CBC W/ AUTO DIFFERENTIAL   AMYLASE   LIPASE   TROPONIN I        All Lab Results:  Results for orders placed or performed during the hospital encounter of 03/12/19   CBC auto differential   Result Value Ref Range    WBC 8.24 3.90 - 12.70 K/uL    RBC 4.10 4.00 - 5.40 M/uL    Hemoglobin 12.5 12.0 - 16.0 g/dL    Hematocrit 37.1 37.0 - 48.5 %    MCV 91 82 - 98 fL    MCH 30.5 27.0 - 31.0 pg    MCHC 33.7 32.0 - 36.0 g/dL    RDW 13.2 11.5 - 14.5 %    Platelets 226 150 - 350 K/uL    MPV 9.8 9.2 - 12.9 fL    Gran # (ANC) 5.2 1.8 - 7.7 K/uL    Lymph # 2.3 1.0 - 4.8 K/uL    Mono # 0.6 0.3 - 1.0 K/uL    Eos # 0.1 0.0 - 0.5 K/uL    Baso # 0.07 0.00 - 0.20 K/uL    Gran% 63.1 38.0 - 73.0 %    Lymph% 27.3 18.0 - 48.0 %    Mono% 7.5 4.0 - " 15.0 %    Eosinophil% 1.3 0.0 - 8.0 %    Basophil% 0.8 0.0 - 1.9 %    Differential Method Automated    Comprehensive metabolic panel   Result Value Ref Range    Sodium 142 136 - 145 mmol/L    Potassium 3.4 (L) 3.5 - 5.1 mmol/L    Chloride 104 95 - 110 mmol/L    CO2 25 23 - 29 mmol/L    Glucose 180 (H) 70 - 110 mg/dL    BUN, Bld 22 10 - 30 mg/dL    Creatinine 0.9 0.5 - 1.4 mg/dL    Calcium 10.4 8.7 - 10.5 mg/dL    Total Protein 8.0 6.0 - 8.4 g/dL    Albumin 4.1 3.5 - 5.2 g/dL    Total Bilirubin 0.5 0.1 - 1.0 mg/dL    Alkaline Phosphatase 98 55 - 135 U/L    AST 25 10 - 40 U/L    ALT 17 10 - 44 U/L    Anion Gap 13 8 - 16 mmol/L    eGFR if African American >60 >60 mL/min/1.73 m^2    eGFR if non African American 52 (A) >60 mL/min/1.73 m^2   Amylase   Result Value Ref Range    Amylase 36 20 - 110 U/L   Lipase   Result Value Ref Range    Lipase 35 4 - 60 U/L   Urinalysis, Reflex to Urine Culture Urine, Clean Catch   Result Value Ref Range    Specimen UA Urine, Catheterized     Color, UA Yellow Yellow, Straw, Arleth    Appearance, UA Clear Clear    pH, UA 7.0 5.0 - 8.0    Specific Gravity, UA 1.025 1.005 - 1.030    Protein, UA 2+ (A) Negative    Glucose, UA Negative Negative    Ketones, UA Trace (A) Negative    Bilirubin (UA) Negative Negative    Occult Blood UA Trace (A) Negative    Nitrite, UA Negative Negative    Urobilinogen, UA Negative <2.0 EU/dL    Leukocytes, UA Negative Negative   Troponin I   Result Value Ref Range    Troponin I 0.025 0.000 - 0.026 ng/mL   Urinalysis Microscopic   Result Value Ref Range    RBC, UA 0 0 - 4 /hpf    WBC, UA 4 0 - 5 /hpf    Bacteria, UA Few (A) None-Occ /hpf    Hyaline Casts, UA 0 0-1/lpf /lpf    Microscopic Comment SEE COMMENT          Imaging Results:  Imaging Results          CT Abdomen Pelvis With Contrast (Final result)  Result time 03/12/19 13:40:21    Final result by Gume Dimas MD (03/12/19 13:40:21)                 Impression:      1.  Bilateral adnexal/ovarian masses.   Right adnexal cystic mass, 6.3 x 5.1 x 5.0 cm.  Left ovarian mass, 3.9 x 2.1 x 3.1 cm.  Recommend pelvic ultrasound to further evaluate.  Ovarian malignancy not excluded.  Uterus is absent.    2.  There are multitude of pancreatic body and tail cystic lesions, likely sequela of IPMN, largest 1.7 cm.    3.  Small liver cysts.  Encapsulated perisplenic fluid collection, probably old subcapsular hematoma sequela.  Bilateral multiple subcentimeter renal cortical cysts.  Marked aortoiliac atherosclerosis.  Mild sigmoid diverticulosis.  Degenerative spine.    All CT scans at this facility are performed  using dose modulation techniques as appropriate to performed exam including the following:  automated exposure control; adjustment of mA and/or kV according to the patients size (this includes techniques or standardized protocols for targeted exams where dose is matched to indication/reason for exam: i.e. extremities or head);  iterative reconstruction technique.      Electronically signed by: Gume Dimas MD  Date:    03/12/2019  Time:    13:40             Narrative:    EXAMINATION:  CT ABDOMEN PELVIS WITH CONTRAST    CLINICAL HISTORY:  Abdominal distension;Nausea, vomiting, diarrhea;    TECHNIQUE:  Abdominal and pelvic CT performed with intravenous contrast with imaging during the portal venous phase following 75 mL Omnipaque 350.  Coronal and sagittal reformations.    COMPARISON:  None    FINDINGS:  Abdominal CT.  Bibasilar dependent pleuroparenchymal bands of atelectasis/scarring and ground-glass opacities.  Coronary artery calcification.  Mitral annulus and aortic valve calcification.  Normal size heart.  Small pericardial effusion.    There is a subcapsular cyst, 1.5 x 0.8 cm, right lobe liver at the gallbladder fossa.  There is also a 5 mm cyst medial segment left lobe liver.    Normal size spleen with encapsulated perisplenic fluid, probably old subcapsular hematoma sequela.    There are innumerable cystic lesions  involving the pancreatic body and tail, largest 1.7 cm, probable IPMN sequela.  There are few tiny cysts involving the head of pancreas.    Adrenal glands are normal.  There are multiple subcentimeter bilateral renal cortical cysts.    Marked aortoiliac atherosclerosis.  No lymphadenopathy.  Normal gallbladder.  Age-appropriate common bile duct dilatation, 9 mm, with normal tapering at the ampulla.  No intrahepatic ductal dilatation.    No bowel obstruction.  Normal appendix.  Mild sigmoid diverticulosis.  The GI tract is otherwise unremarkable.    Dextroscoliosis lumbar spine.  Advanced L2-3 degenerative disc disease with retrolisthesis L2 and L3.  There is also minimal retrolisthesis of L3 on L4 with associated L3-4 degenerative disc disease.  L5-S1 facet arthropathy.  The bones are osteopenic.    Pelvic CT.  The pelvic ring is intact.  Left hip joint replacement.    There is a right adnexal cystic mass, 6.3 x 5.0 x 5.1 cm, likely ovarian in origin.  Enlarged left ovary, 3.9 x 2.1 x 3.1 cm, with a solid-appearing mass.  The uterus appears to be absent.    Ureters urinary bladder unremarkable.  The pelvic bowel loops are otherwise unremarkable.  No pelvic free fluid or lymphadenopathy.                                 The EKG was ordered, reviewed, and independently interpreted by the ED provider.  Interpretation time: 10:18  Rate: 65 BPM  Rhythm: normal sinus rhythm with sinus arrhythmia    Interpretation: Normal ECG. No ST & T wave abnormalities. No STEMI.             The Emergency Provider reviewed the vital signs and test results, which are outlined above.     ED Discussion     2:19 PM: Reassessed pt at this time.  Pt states her condition has alleviated at this time. Discussed with pt all pertinent ED information and results. Discussed pt plan of tx. Gave pt all f/u and return to the ED instructions. Pt advised to f/u with Dr. Escamilla. All questions and concerns were addressed at this time. Pt expresses  understanding of information and instructions, and is comfortable with plan to discharge. Pt is stable for discharge.    I discussed with patient and/or family/caretaker that evaluation in the ED does not suggest any emergent or life threatening medical conditions requiring immediate intervention beyond what was provided in the ED, and I believe patient is safe for discharge.  Regardless, an unremarkable evaluation in the ED does not preclude the development or presence of a serious of life threatening condition. As such, patient was instructed to return immediately for any worsening or change in current symptoms.    2:24 PM  Patient is stable nontoxic.  Clinically she has gastritis with a negative workup.  I have discussed with her the findings of ovarian cyst within need for further evaluation by ultrasound.  Via secure chat, discussed the case with  who is arranging close follow-up for the patient.  I discussed all findings with the patient and her family and they verbalized agreement understanding with all.  She is stable for discharge in my opinion.    ED Medication(s):  Medications   famotidine tablet 20 mg (not administered)   sodium chloride 0.9% bolus 500 mL (0 mLs Intravenous Stopped 3/12/19 1208)   ondansetron injection 4 mg (4 mg Intravenous Given 3/12/19 1048)   hydrALAZINE injection 10 mg (10 mg Intravenous Given 3/12/19 1048)   omnipaque 350 iohexol 100 mL (75 mLs Intravenous Given 3/12/19 1305)   ondansetron injection 4 mg (4 mg Intravenous Given 3/12/19 1338)   GI cocktail (mylanta 30 mL, lidocaine 2 % viscous 10 mL, dicyclomine 10 mL) 50 mL (50 mLs Oral Given 3/12/19 1338)   famotidine (PF) injection 20 mg (20 mg Intravenous Given 3/12/19 1358)       Current Discharge Medication List      START taking these medications    Details   ondansetron (ZOFRAN) 4 MG tablet Take 1 tablet (4 mg total) by mouth every 6 (six) hours as needed for Nausea.  Qty: 60 tablet, Refills: 0               Medical  Decision Making:   Clinical Tests:   Lab Tests: Ordered and Reviewed  Radiological Study: Ordered and Reviewed  Medical Tests: Ordered and Reviewed             Scribe Attestation:   Scribe #1: I performed the above scribed service and the documentation accurately describes the services I performed. I attest to the accuracy of the note.     Attending:   Physician Attestation Statement for Scribe #1: I, Yury Hernandez Jr., MD, personally performed the services described in this documentation, as scribed by Rukhsana Gillette, in my presence, and it is both accurate and complete.           Clinical Impression       ICD-10-CM ICD-9-CM   1. Gastritis, presence of bleeding unspecified, unspecified chronicity, unspecified gastritis type K29.70 535.50   2. HTN (hypertension) I10 401.9       Disposition:   Disposition: Discharged  Condition: Stable         Yury Hernandez Jr., MD  03/12/19 142

## 2019-03-12 NOTE — TELEPHONE ENCOUNTER
----- Message from Kevin Escamilla MD sent at 3/12/2019  2:12 PM CDT -----  Please set up to see me on Friday - went to ER today and is being discharged.

## 2019-03-13 ENCOUNTER — TELEPHONE (OUTPATIENT)
Dept: INTERNAL MEDICINE | Facility: CLINIC | Age: 84
End: 2019-03-13

## 2019-03-13 ENCOUNTER — NURSE TRIAGE (OUTPATIENT)
Dept: ADMINISTRATIVE | Facility: CLINIC | Age: 84
End: 2019-03-13

## 2019-03-13 NOTE — TELEPHONE ENCOUNTER
Returned call to patient's . States patient is still nausea and her blood pressure is elevated. Patient went to the emergency room on yesterday and was diagnosis ed with gastritis and hypertension. Patient took Zofran and Pepcid this morning around 930 am, and states it is not helping. Patient's blood pressure was taken at 9 am and pressure was 191/71, blood pressure retaken at 11 am and is 176/66. Patient took amlodipine and losartan at 8 am this morning. Please advise

## 2019-03-13 NOTE — TELEPHONE ENCOUNTER
----- Message from Anne Palomo sent at 3/13/2019  9:58 AM CDT -----  Contact: pt   Call pt regarding blood pressure being 180/92. I tried to reached the nurse but didn't get a answer so I forward the call to the on call nurse.    [phone] (home)

## 2019-03-13 NOTE — TELEPHONE ENCOUNTER
Was in the ER all day yesterday, with upset stomach and high blood pressure.  It was still elevated when they sent her home.  Her blood pressure, 191/71, she is nauseated and vomited a small amount.  He gave her zofran and famotidine, but she is still nauseated.   Unable to reach anyone in Dr. Escamilla's office via IM or phone.  Advised pt go to the ED.    Reason for Disposition   Systolic BP >= 160 OR Diastolic >= 100, and any cardiac or neurologic symptoms (e.g., chest pain, difficulty breathing, unsteady gait, blurred vision)    Protocols used: HIGH BLOOD PRESSURE-A-OH

## 2019-03-14 NOTE — TELEPHONE ENCOUNTER
----- Message from Suzy Sheth sent at 3/14/2019 12:35 PM CDT -----  Contact:   Type:  Patient Returning Call    Who Called: JesúsRey  Who Left Message for Patient: Valerie  Does the patient know what this is regarding?: no  Would the patient rather a call back or a response via [facility]? Call back  Best Call Back Number: [phone]  Additional Information: n/a

## 2019-03-15 ENCOUNTER — OFFICE VISIT (OUTPATIENT)
Dept: INTERNAL MEDICINE | Facility: CLINIC | Age: 84
End: 2019-03-15
Payer: MEDICARE

## 2019-03-15 VITALS
RESPIRATION RATE: 20 BRPM | SYSTOLIC BLOOD PRESSURE: 196 MMHG | WEIGHT: 89.94 LBS | OXYGEN SATURATION: 99 % | BODY MASS INDEX: 15.94 KG/M2 | HEIGHT: 63 IN | DIASTOLIC BLOOD PRESSURE: 64 MMHG | HEART RATE: 61 BPM | TEMPERATURE: 97 F

## 2019-03-15 DIAGNOSIS — I10 HYPERTENSION, UNSPECIFIED TYPE: Primary | ICD-10-CM

## 2019-03-15 DIAGNOSIS — K59.00 CONSTIPATION, UNSPECIFIED CONSTIPATION TYPE: ICD-10-CM

## 2019-03-15 DIAGNOSIS — N83.8 OVARIAN MASS: ICD-10-CM

## 2019-03-15 PROCEDURE — 99999 PR PBB SHADOW E&M-EST. PATIENT-LVL IV: ICD-10-PCS | Mod: PBBFAC,HCNC,, | Performed by: FAMILY MEDICINE

## 2019-03-15 PROCEDURE — 1101F PR PT FALLS ASSESS DOC 0-1 FALLS W/OUT INJ PAST YR: ICD-10-PCS | Mod: HCNC,CPTII,S$GLB, | Performed by: FAMILY MEDICINE

## 2019-03-15 PROCEDURE — 99999 PR PBB SHADOW E&M-EST. PATIENT-LVL IV: CPT | Mod: PBBFAC,HCNC,, | Performed by: FAMILY MEDICINE

## 2019-03-15 PROCEDURE — 99214 PR OFFICE/OUTPT VISIT, EST, LEVL IV, 30-39 MIN: ICD-10-PCS | Mod: HCNC,S$GLB,, | Performed by: FAMILY MEDICINE

## 2019-03-15 PROCEDURE — 99214 OFFICE O/P EST MOD 30 MIN: CPT | Mod: HCNC,S$GLB,, | Performed by: FAMILY MEDICINE

## 2019-03-15 PROCEDURE — 1101F PT FALLS ASSESS-DOCD LE1/YR: CPT | Mod: HCNC,CPTII,S$GLB, | Performed by: FAMILY MEDICINE

## 2019-03-15 RX ORDER — HYDRALAZINE HYDROCHLORIDE 25 MG/1
25 TABLET, FILM COATED ORAL 3 TIMES DAILY
Qty: 90 TABLET | Refills: 3 | Status: SHIPPED | OUTPATIENT
Start: 2019-03-15 | End: 2019-03-29 | Stop reason: DRUGHIGH

## 2019-03-15 RX ORDER — POLYETHYLENE GLYCOL 3350 17 G/17G
17 POWDER, FOR SOLUTION ORAL
Qty: 30 EACH | Refills: 0 | Status: SHIPPED | OUTPATIENT
Start: 2019-03-15

## 2019-03-15 NOTE — PROGRESS NOTES
Subjective:       Patient ID: Lauren Le is a 89 y.o. female.    Chief Complaint: Hospital Follow Up    HPI     89 yo F    HTN    Recent ER visit    Feeling better  No abdominal pain  No urination issues    Some constipation issue    Use of Zofran PRN has declined    Pressures running elevated at home still    Ovarian masses noted on CT  Reviewed image    Review of Systems   Constitutional: Positive for unexpected weight change. Negative for activity change.   HENT: Positive for trouble swallowing. Negative for hearing loss and rhinorrhea.    Eyes: Negative for discharge and visual disturbance.   Respiratory: Negative for chest tightness and wheezing.    Cardiovascular: Negative for chest pain and palpitations.   Gastrointestinal: Positive for constipation. Negative for blood in stool, diarrhea and vomiting.   Endocrine: Negative for polydipsia and polyuria.   Genitourinary: Negative for difficulty urinating, dysuria, hematuria and menstrual problem.   Musculoskeletal: Negative for arthralgias, joint swelling and neck pain.   Neurological: Negative for weakness and headaches.   Psychiatric/Behavioral: Positive for confusion. Negative for dysphoric mood.       Objective:       Vitals:    03/15/19 1437   BP: (!) 196/64   Pulse: 61   Resp: 20   Temp: 96.8 °F (36 °C)       Physical Exam   Constitutional: She is oriented to person, place, and time. She appears well-developed and well-nourished. She does not have a sickly appearance. No distress.   Thin  In wheelchair  pleasant   HENT:   Head: Normocephalic and atraumatic.   Right Ear: External ear normal.   Left Ear: External ear normal.   Eyes: Conjunctivae, EOM and lids are normal.   Neck: Trachea normal, normal range of motion and full passive range of motion without pain.   Cardiovascular: Normal rate, regular rhythm and normal heart sounds.   Pulmonary/Chest: Effort normal and breath sounds normal. No stridor. No respiratory distress.   Abdominal: Soft. Normal  appearance and bowel sounds are normal. She exhibits no distension and no mass. There is no tenderness. There is no guarding. No hernia.   Musculoskeletal: Normal range of motion.   Lymphadenopathy:     She has no cervical adenopathy.   Neurological: She is alert and oriented to person, place, and time. She is not disoriented.   Skin: Skin is warm, dry and intact. No rash noted. She is not diaphoretic.   Psychiatric: She has a normal mood and affect. Her speech is normal and behavior is normal. Thought content normal.       Assessment:       1. Hypertension, unspecified type    2. Ovarian mass    3. Constipation, unspecified constipation type        Plan:   Hypertension    Difficult to control  Very elevated    Will add hydralazine with intention of titrating upwards    Continue the amlodipine 10 mg  Continue the losartan 100 mg  Continue the metoprolol 50 mg    -     hydrALAZINE (APRESOLINE) 25 MG tablet; Take 1 tablet (25 mg total) by mouth 3 (three) times daily.  Dispense: 90 tablet; Refill: 3    Constipation  miralax  PRN    Ovarian Mass  Will obtain US  Will consult gyn  Or gyn onc if needed.    2 week f/u  1 week I expected to hear from nephew      No Follow-up on file.

## 2019-03-18 ENCOUNTER — TELEPHONE (OUTPATIENT)
Dept: INTERNAL MEDICINE | Facility: CLINIC | Age: 84
End: 2019-03-18

## 2019-03-18 NOTE — TELEPHONE ENCOUNTER
----- Message from Vicky Wilkes sent at 3/18/2019  8:16 AM CDT -----  Contact: pt caregiver Mr Gillis   Type:  Patient Returning Call    Who Called: pt caregiver Mr Gillis  Who Left Message for Patient: Dr Escamilla Office  Does the patient know what this is regarding?: no not sure   Would the patient rather a call back or a response via [facility]? Call back  Best Call Back Number: [phone] or [phone]   Additional Information: Pt hasn't had a bowel movement in the past five days

## 2019-03-18 NOTE — TELEPHONE ENCOUNTER
Asked the patient's caregiver can she assist the patient with stimulating the patient's anus to help with the hard stool that wont come out. If that does not work call out office.  Will send a message to Dr Escamilla to see what he says. /yehuda/

## 2019-03-19 RX ORDER — LEVOTHYROXINE SODIUM 125 UG/1
125 TABLET ORAL DAILY
Qty: 90 TABLET | Refills: 0 | Status: SHIPPED | OUTPATIENT
Start: 2019-03-19

## 2019-03-22 ENCOUNTER — TELEPHONE (OUTPATIENT)
Dept: INTERNAL MEDICINE | Facility: CLINIC | Age: 84
End: 2019-03-22

## 2019-03-22 NOTE — TELEPHONE ENCOUNTER
----- Message from Cate Jang sent at 3/22/2019  1:07 PM CDT -----  Contact: Patient's nephew Carlin  Type:  Needs Medical Advice    Who Called: Mr Gillis  Symptoms (please be specific):    How long has patient had these symptoms:    Pharmacy name and phone #:    Would the patient rather a call back or a response via [facility]? call  Best Call Back Number: [phone]  Additional Information: Patients blood pressure readings are; 7:00am 121/56 NH 67, 1:00 139/56 NH 76

## 2019-03-22 NOTE — TELEPHONE ENCOUNTER
Spoke with Carlin, he gave BP reading. They have come down sit last visit and with the addition of hydralzine. Dr. Escamilla out of office today, but will forward to partner to review BP's. Advised Carlin to have pt continue meds as rx'd unless we receive different instructions from provider. He voiced understanding.

## 2019-03-26 ENCOUNTER — HOSPITAL ENCOUNTER (OUTPATIENT)
Dept: RADIOLOGY | Facility: HOSPITAL | Age: 84
Discharge: HOME | End: 2019-03-26
Attending: FAMILY MEDICINE
Payer: MEDICARE

## 2019-03-26 DIAGNOSIS — N83.8 OVARIAN MASS: ICD-10-CM

## 2019-03-26 PROCEDURE — 76856 US EXAM PELVIC COMPLETE: CPT | Mod: TC,HCNC

## 2019-03-29 ENCOUNTER — OFFICE VISIT (OUTPATIENT)
Dept: INTERNAL MEDICINE | Facility: CLINIC | Age: 84
End: 2019-03-29
Payer: MEDICARE

## 2019-03-29 VITALS
BODY MASS INDEX: 16.48 KG/M2 | OXYGEN SATURATION: 97 % | SYSTOLIC BLOOD PRESSURE: 160 MMHG | RESPIRATION RATE: 18 BRPM | HEART RATE: 69 BPM | WEIGHT: 93.06 LBS | DIASTOLIC BLOOD PRESSURE: 65 MMHG | TEMPERATURE: 97 F

## 2019-03-29 DIAGNOSIS — R63.0 POOR APPETITE: ICD-10-CM

## 2019-03-29 DIAGNOSIS — Z86.39 HISTORY OF DIABETES MELLITUS: ICD-10-CM

## 2019-03-29 DIAGNOSIS — N83.209 CYST OF OVARY, UNSPECIFIED LATERALITY: ICD-10-CM

## 2019-03-29 DIAGNOSIS — Z79.899 POLYPHARMACY: ICD-10-CM

## 2019-03-29 DIAGNOSIS — I10 HYPERTENSION, UNSPECIFIED TYPE: Primary | ICD-10-CM

## 2019-03-29 PROCEDURE — 1100F PTFALLS ASSESS-DOCD GE2>/YR: CPT | Mod: HCNC,CPTII,S$GLB, | Performed by: FAMILY MEDICINE

## 2019-03-29 PROCEDURE — 99999 PR PBB SHADOW E&M-EST. PATIENT-LVL III: CPT | Mod: PBBFAC,HCNC,, | Performed by: FAMILY MEDICINE

## 2019-03-29 PROCEDURE — 1100F PR PT FALLS ASSESS DOC 2+ FALLS/FALL W/INJURY/YR: ICD-10-PCS | Mod: HCNC,CPTII,S$GLB, | Performed by: FAMILY MEDICINE

## 2019-03-29 PROCEDURE — 99214 OFFICE O/P EST MOD 30 MIN: CPT | Mod: HCNC,S$GLB,, | Performed by: FAMILY MEDICINE

## 2019-03-29 PROCEDURE — 99214 PR OFFICE/OUTPT VISIT, EST, LEVL IV, 30-39 MIN: ICD-10-PCS | Mod: HCNC,S$GLB,, | Performed by: FAMILY MEDICINE

## 2019-03-29 PROCEDURE — 3288F FALL RISK ASSESSMENT DOCD: CPT | Mod: HCNC,CPTII,S$GLB, | Performed by: FAMILY MEDICINE

## 2019-03-29 PROCEDURE — 3288F PR FALLS RISK ASSESSMENT DOCUMENTED: ICD-10-PCS | Mod: HCNC,CPTII,S$GLB, | Performed by: FAMILY MEDICINE

## 2019-03-29 PROCEDURE — 99999 PR PBB SHADOW E&M-EST. PATIENT-LVL III: ICD-10-PCS | Mod: PBBFAC,HCNC,, | Performed by: FAMILY MEDICINE

## 2019-03-29 RX ORDER — HYDRALAZINE HYDROCHLORIDE 50 MG/1
50 TABLET, FILM COATED ORAL 3 TIMES DAILY
Qty: 90 TABLET | Refills: 0 | Status: SHIPPED | OUTPATIENT
Start: 2019-03-29 | End: 2020-03-28

## 2019-03-29 NOTE — PROGRESS NOTES
Subjective:       Patient ID: Lauren Le is a 89 y.o. female.    Chief Complaint: Follow-up    HPI     89 F    F/u Visit    Constipation concerns  Cleared up with miralax and an enema    US obtained    1. The right ovary is not definitely visualized. There is a cystic mass in the expected location of the right ovary that measures 5.9 cm x 4.2 cm x 4.4 cm.  2. The left ovary measures 3.2 cm by 1.8 cm x 2.3 cm.  There is an 18 mm anechoic cyst associated with the left ovary.  3. The uterus is absent.      No pain  No abdominal discomfort.  No pelvic discomfort    PO intake seem is a little less  Drinking boost      We stopped  Gabapentin as she felt no pain.  Nephew noted memory improvement.  He also not she feels colder.    Home BP checks are reported to be better than in office.      Review of Systems   Constitutional: Negative for activity change.   Eyes: Negative for discharge.   Respiratory: Negative for wheezing.    Cardiovascular: Negative for chest pain and palpitations.   Gastrointestinal: Positive for constipation. Negative for diarrhea and vomiting.   Genitourinary: Negative for difficulty urinating and hematuria.   Neurological: Negative for headaches.   Psychiatric/Behavioral: Negative for dysphoric mood.       Objective:       Vitals:    03/29/19 1307   BP: (!) 160/65   Pulse:    Resp:    Temp:        Physical Exam   Constitutional: She is oriented to person, place, and time. She appears well-developed and well-nourished. She does not have a sickly appearance. No distress.   Thin  In wheelchair  Pleasant demeanor   HENT:   Head: Normocephalic and atraumatic.   Right Ear: External ear normal.   Left Ear: External ear normal.   Eyes: Conjunctivae, EOM and lids are normal.   Neck: Trachea normal, normal range of motion and full passive range of motion without pain.   Cardiovascular: Normal rate, regular rhythm and normal heart sounds.   Pulmonary/Chest: Effort normal and breath sounds normal. No  stridor. No respiratory distress.   Abdominal: Soft. Normal appearance and bowel sounds are normal. She exhibits no distension. There is no tenderness. There is no guarding. No hernia.   Musculoskeletal: Normal range of motion.   Lymphadenopathy:     She has no cervical adenopathy.   Neurological: She is alert and oriented to person, place, and time. She is not disoriented.   Skin: Skin is warm, dry and intact. No rash noted. She is not diaphoretic.   Psychiatric: She has a normal mood and affect. Her speech is normal and behavior is normal. Thought content normal.       Assessment:       1. Hypertension, unspecified type    2. Cyst of ovary, unspecified laterality    3. History of diabetes mellitus    4. Polypharmacy    5. Poor appetite        Plan:   Hypertension, unspecified type    Elevated again today    At last visit started hydralazine 25 mg TID  Will increase that to 50 mg    Continue amlodipine, losartan, metoprolol    They are checking pressures at home.  Has not been running this high.  [facility]phbill Carlin - reports concern his machine is not correct.    Will start digital hypertension.    F/u with me with log in 2 weeks.    Cyst on ovary   not pain  Will monitor    H/O DM  My visible A1c are in pre-diabetic ranges.  tradjenta - well controlled.    Polypharmacy  Reduce meds where we can  We stopped gabapentin -  Seemed to help memory  I do not suspect it to be playing a role in her feeling cold at home    Poor appetite  enocouraged boost/ensure  Will consider Remeron at next visit  Although as said above - trying to minimize pill burden.      No follow-ups on file.

## 2019-04-12 ENCOUNTER — OFFICE VISIT (OUTPATIENT)
Dept: INTERNAL MEDICINE | Facility: CLINIC | Age: 84
End: 2019-04-12
Payer: MEDICARE

## 2019-04-12 ENCOUNTER — LAB VISIT (OUTPATIENT)
Dept: LAB | Facility: HOSPITAL | Age: 84
End: 2019-04-12
Attending: FAMILY MEDICINE
Payer: MEDICARE

## 2019-04-12 VITALS
BODY MASS INDEX: 17.34 KG/M2 | DIASTOLIC BLOOD PRESSURE: 56 MMHG | OXYGEN SATURATION: 97 % | RESPIRATION RATE: 20 BRPM | HEART RATE: 66 BPM | HEIGHT: 63 IN | WEIGHT: 97.88 LBS | TEMPERATURE: 97 F | SYSTOLIC BLOOD PRESSURE: 108 MMHG

## 2019-04-12 DIAGNOSIS — R05.9 COUGH: ICD-10-CM

## 2019-04-12 DIAGNOSIS — R52 GENERALIZED PAIN: ICD-10-CM

## 2019-04-12 DIAGNOSIS — R42 DIZZINESS: Primary | ICD-10-CM

## 2019-04-12 DIAGNOSIS — R07.9 CHEST PAIN, UNSPECIFIED TYPE: ICD-10-CM

## 2019-04-12 DIAGNOSIS — R01.1 HEART MURMUR: ICD-10-CM

## 2019-04-12 DIAGNOSIS — R05.9 COUGHING: ICD-10-CM

## 2019-04-12 LAB
BASOPHILS # BLD AUTO: 0.07 K/UL (ref 0–0.2)
BASOPHILS NFR BLD: 0.9 % (ref 0–1.9)
DIFFERENTIAL METHOD: ABNORMAL
EOSINOPHIL # BLD AUTO: 0.5 K/UL (ref 0–0.5)
EOSINOPHIL NFR BLD: 6.1 % (ref 0–8)
ERYTHROCYTE [DISTWIDTH] IN BLOOD BY AUTOMATED COUNT: 13.9 % (ref 11.5–14.5)
HCT VFR BLD AUTO: 32.5 % (ref 37–48.5)
HGB BLD-MCNC: 10.2 G/DL (ref 12–16)
IMM GRANULOCYTES # BLD AUTO: 0.03 K/UL (ref 0–0.04)
IMM GRANULOCYTES NFR BLD AUTO: 0.4 % (ref 0–0.5)
LYMPHOCYTES # BLD AUTO: 2.2 K/UL (ref 1–4.8)
LYMPHOCYTES NFR BLD: 27.5 % (ref 18–48)
MCH RBC QN AUTO: 30.5 PG (ref 27–31)
MCHC RBC AUTO-ENTMCNC: 31.4 G/DL (ref 32–36)
MCV RBC AUTO: 97 FL (ref 82–98)
MONOCYTES # BLD AUTO: 0.8 K/UL (ref 0.3–1)
MONOCYTES NFR BLD: 10.5 % (ref 4–15)
NEUTROPHILS # BLD AUTO: 4.3 K/UL (ref 1.8–7.7)
NEUTROPHILS NFR BLD: 54.6 % (ref 38–73)
NRBC BLD-RTO: 0 /100 WBC
PLATELET # BLD AUTO: 203 K/UL (ref 150–350)
PMV BLD AUTO: 11.5 FL (ref 9.2–12.9)
PROCALCITONIN SERPL IA-MCNC: 0.21 NG/ML
RBC # BLD AUTO: 3.34 M/UL (ref 4–5.4)
TROPONIN I SERPL DL<=0.01 NG/ML-MCNC: 1.86 NG/ML (ref 0–0.03)
WBC # BLD AUTO: 7.82 K/UL (ref 3.9–12.7)

## 2019-04-12 PROCEDURE — 84145 PROCALCITONIN (PCT): CPT | Mod: HCNC

## 2019-04-12 PROCEDURE — 1101F PT FALLS ASSESS-DOCD LE1/YR: CPT | Mod: HCNC,CPTII,S$GLB, | Performed by: FAMILY MEDICINE

## 2019-04-12 PROCEDURE — 99214 OFFICE O/P EST MOD 30 MIN: CPT | Mod: HCNC,S$GLB,, | Performed by: FAMILY MEDICINE

## 2019-04-12 PROCEDURE — 99214 PR OFFICE/OUTPT VISIT, EST, LEVL IV, 30-39 MIN: ICD-10-PCS | Mod: HCNC,S$GLB,, | Performed by: FAMILY MEDICINE

## 2019-04-12 PROCEDURE — 84484 ASSAY OF TROPONIN QUANT: CPT | Mod: HCNC

## 2019-04-12 PROCEDURE — 99999 PR PBB SHADOW E&M-EST. PATIENT-LVL IV: CPT | Mod: PBBFAC,HCNC,, | Performed by: FAMILY MEDICINE

## 2019-04-12 PROCEDURE — 85025 COMPLETE CBC W/AUTO DIFF WBC: CPT | Mod: HCNC

## 2019-04-12 PROCEDURE — 93000 ELECTROCARDIOGRAM COMPLETE: CPT | Mod: HCNC,S$GLB,, | Performed by: NUCLEAR MEDICINE

## 2019-04-12 PROCEDURE — 36415 COLL VENOUS BLD VENIPUNCTURE: CPT | Mod: HCNC

## 2019-04-12 PROCEDURE — 99999 PR PBB SHADOW E&M-EST. PATIENT-LVL IV: ICD-10-PCS | Mod: PBBFAC,HCNC,, | Performed by: FAMILY MEDICINE

## 2019-04-12 PROCEDURE — 1101F PR PT FALLS ASSESS DOC 0-1 FALLS W/OUT INJ PAST YR: ICD-10-PCS | Mod: HCNC,CPTII,S$GLB, | Performed by: FAMILY MEDICINE

## 2019-04-12 PROCEDURE — 93000 EKG 12-LEAD: ICD-10-PCS | Mod: HCNC,S$GLB,, | Performed by: NUCLEAR MEDICINE

## 2019-04-12 RX ORDER — ONDANSETRON 4 MG/1
4 TABLET, FILM COATED ORAL EVERY 6 HOURS PRN
Qty: 60 TABLET | Refills: 0 | Status: SHIPPED | OUTPATIENT
Start: 2019-04-12 | End: 2019-05-12

## 2019-04-12 NOTE — PATIENT INSTRUCTIONS
Blood Pressure:    She is on a lot of blood pressure medicine  It is low today  I want to the hydralazine for now - do not give her hydralzine    If pressure continues to run low - we will cut amlodipine in half.    Continue the others.    This maybe why she feels dizzy.    I would like to do some test today to assess her heart.

## 2019-04-12 NOTE — PROGRESS NOTES
"Subjective:       Patient ID: Lauren Le is a 89 y.o. female.    Chief Complaint: Cough; Chest Pain; Dizziness; Nausea; and Knee Pain (right)    HPI     89     Cough  Last night had chest pain  Seemed to improve    Earlier today she felt dizzy  Her eyes feel " swimming"  No vision  A little dizzy    Breathing good.  No SOB    No dysuria or issues with urination at all.    She reports eating well.    Woke up and chest was hurting last night  Has resolved.    Seems big complaint is dizziness today.    We recently took her off of gabapentin.      Review of Systems   Constitutional: Negative for activity change and unexpected weight change.   HENT: Positive for trouble swallowing. Negative for hearing loss and rhinorrhea.    Eyes: Negative for discharge and visual disturbance.   Respiratory: Positive for wheezing. Negative for chest tightness.    Cardiovascular: Positive for chest pain. Negative for palpitations.   Gastrointestinal: Negative for blood in stool, constipation, diarrhea and vomiting.   Endocrine: Negative for polydipsia and polyuria.   Genitourinary: Negative for difficulty urinating, dysuria, hematuria and menstrual problem.   Musculoskeletal: Positive for arthralgias. Negative for joint swelling and neck pain.   Neurological: Negative for weakness and headaches.   Psychiatric/Behavioral: Positive for confusion. Negative for dysphoric mood.       Objective:       Vitals:    04/12/19 1058   BP: (!) 108/56   Pulse: 66   Resp: 20   Temp: 97 °F (36.1 °C)       Physical Exam   Constitutional: She is oriented to person, place, and time. She appears well-developed and well-nourished. She does not have a sickly appearance. No distress.   HENT:   Head: Normocephalic and atraumatic.   Right Ear: External ear normal.   Left Ear: External ear normal.   Eyes: Conjunctivae, EOM and lids are normal.   Neck: Trachea normal, normal range of motion and full passive range of motion without pain.   Cardiovascular: " "Normal rate and regular rhythm.   Murmur heard.  Pulmonary/Chest: Effort normal and breath sounds normal. No stridor. No respiratory distress. She has no wheezes. She has no rales. She exhibits no tenderness.   Abdominal: Soft. Normal appearance and bowel sounds are normal. She exhibits no distension. There is no tenderness. There is no guarding. No hernia.   No lower abdomen tenderness   Musculoskeletal: Normal range of motion.   Lymphadenopathy:     She has no cervical adenopathy.   Neurological: She is alert and oriented to person, place, and time. She is not disoriented.   Skin: Skin is warm, dry and intact. No rash noted. She is not diaphoretic.   Psychiatric: She has a normal mood and affect. Her speech is normal and behavior is normal. Thought content normal.       Assessment:       1. Dizziness    2. Generalized pain    3. Chest pain, unspecified type    4. Heart murmur    5. Cough    6. Coughing        Plan:     Patient appears to be in no acute distress at present and is pleasant as always. However some of her symptoms are concerning - especially given her age. She had an episode of chest pain last night that woke her - plan on obtaining a troponin and EKG today to evaluate for potential MI overnight - although she seems well and my index of suspicion is low. I do worry we have lowered pressure too much with medication - as it has been difficult to control as of late. I will place the hydralazine as hold and ensure adequate hydration. Refilled zofran for prn nausea to ensure she is able to drink fluids. I see no echo - in history and given substantial murmur I will order one to evaluate source. It is difficult to assess what/ how much should be done / patient wants done as she is 89 years old. She declines meeting a cardiologist at present and when asked how much she wants me to pursue she is vague telling me "what I feel is necessary".    Nephew/caretaker returned to use of gabapentin for diffuse pains " and she seemed to tolerate / benefit. Will continue for now.    I want to see her next week.  No active infection suspected as source of low pressure.  Will evaluate cbc/procalcitonin in addition to Troponin.      Dizziness  Pressure is low today  Will reduce blood pressure medication.    Coughing  Will obtain a chest x-ray    Generalized pain  Restarted gabapentin and seemed to help.      Chest pain  ekg  Troponin      Heart murmur  Suspect AS  Could be contributing to dizziness.    ECHO to be obtained.      -     ondansetron (ZOFRAN) 4 MG tablet; Take 1 tablet (4 mg total) by mouth every 6 (six) hours as needed for Nausea.  Dispense: 60 tablet; Refill: 0        No follow-ups on file.